# Patient Record
Sex: MALE | Race: WHITE | NOT HISPANIC OR LATINO | URBAN - METROPOLITAN AREA
[De-identification: names, ages, dates, MRNs, and addresses within clinical notes are randomized per-mention and may not be internally consistent; named-entity substitution may affect disease eponyms.]

---

## 2023-02-23 LAB
ALANINE AMINOTRANSFERASE (SGPT) (U/L) IN SER/PLAS: 49 U/L (ref 10–52)
ALBUMIN (G/DL) IN SER/PLAS: 4.5 G/DL (ref 3.4–5)
ALKALINE PHOSPHATASE (U/L) IN SER/PLAS: 76 U/L (ref 33–120)
ANION GAP IN SER/PLAS: 11 MMOL/L (ref 10–20)
ASPARTATE AMINOTRANSFERASE (SGOT) (U/L) IN SER/PLAS: 29 U/L (ref 9–39)
BASOPHILS (10*3/UL) IN BLOOD BY AUTOMATED COUNT: 0.04 X10E9/L (ref 0–0.1)
BASOPHILS/100 LEUKOCYTES IN BLOOD BY AUTOMATED COUNT: 0.6 % (ref 0–2)
BILIRUBIN TOTAL (MG/DL) IN SER/PLAS: 0.6 MG/DL (ref 0–1.2)
C REACTIVE PROTEIN (MG/L) IN SER/PLAS: 0.46 MG/DL
CALCIUM (MG/DL) IN SER/PLAS: 9.6 MG/DL (ref 8.6–10.6)
CARBON DIOXIDE, TOTAL (MMOL/L) IN SER/PLAS: 27 MMOL/L (ref 21–32)
CHLORIDE (MMOL/L) IN SER/PLAS: 104 MMOL/L (ref 98–107)
CREATININE (MG/DL) IN SER/PLAS: 0.93 MG/DL (ref 0.5–1.3)
EOSINOPHILS (10*3/UL) IN BLOOD BY AUTOMATED COUNT: 0.18 X10E9/L (ref 0–0.7)
EOSINOPHILS/100 LEUKOCYTES IN BLOOD BY AUTOMATED COUNT: 2.5 % (ref 0–6)
ERYTHROCYTE DISTRIBUTION WIDTH (RATIO) BY AUTOMATED COUNT: 12.8 % (ref 11.5–14.5)
ERYTHROCYTE MEAN CORPUSCULAR HEMOGLOBIN CONCENTRATION (G/DL) BY AUTOMATED: 32.8 G/DL (ref 32–36)
ERYTHROCYTE MEAN CORPUSCULAR VOLUME (FL) BY AUTOMATED COUNT: 90 FL (ref 80–100)
ERYTHROCYTES (10*6/UL) IN BLOOD BY AUTOMATED COUNT: 4.97 X10E12/L (ref 4.5–5.9)
GFR MALE: >90 ML/MIN/1.73M2
GLUCOSE (MG/DL) IN SER/PLAS: 113 MG/DL (ref 74–99)
HEMATOCRIT (%) IN BLOOD BY AUTOMATED COUNT: 44.8 % (ref 41–52)
HEMOGLOBIN (G/DL) IN BLOOD: 14.7 G/DL (ref 13.5–17.5)
IMMATURE GRANULOCYTES/100 LEUKOCYTES IN BLOOD BY AUTOMATED COUNT: 0.3 % (ref 0–0.9)
LEUKOCYTES (10*3/UL) IN BLOOD BY AUTOMATED COUNT: 7.1 X10E9/L (ref 4.4–11.3)
LYMPHOCYTES (10*3/UL) IN BLOOD BY AUTOMATED COUNT: 2.02 X10E9/L (ref 1.2–4.8)
LYMPHOCYTES/100 LEUKOCYTES IN BLOOD BY AUTOMATED COUNT: 28.5 % (ref 13–44)
MONOCYTES (10*3/UL) IN BLOOD BY AUTOMATED COUNT: 0.52 X10E9/L (ref 0.1–1)
MONOCYTES/100 LEUKOCYTES IN BLOOD BY AUTOMATED COUNT: 7.3 % (ref 2–10)
NEUTROPHILS (10*3/UL) IN BLOOD BY AUTOMATED COUNT: 4.32 X10E9/L (ref 1.2–7.7)
NEUTROPHILS/100 LEUKOCYTES IN BLOOD BY AUTOMATED COUNT: 60.8 % (ref 40–80)
NRBC (PER 100 WBCS) BY AUTOMATED COUNT: 0 /100 WBC (ref 0–0)
PLATELETS (10*3/UL) IN BLOOD AUTOMATED COUNT: 215 X10E9/L (ref 150–450)
POTASSIUM (MMOL/L) IN SER/PLAS: 3.8 MMOL/L (ref 3.5–5.3)
PROTEIN TOTAL: 7.6 G/DL (ref 6.4–8.2)
SEDIMENTATION RATE, ERYTHROCYTE: 29 MM/H (ref 0–15)
SODIUM (MMOL/L) IN SER/PLAS: 138 MMOL/L (ref 136–145)
UREA NITROGEN (MG/DL) IN SER/PLAS: 19 MG/DL (ref 6–23)

## 2023-04-22 LAB
ALANINE AMINOTRANSFERASE (SGPT) (U/L) IN SER/PLAS: 34 U/L (ref 10–52)
ALBUMIN (G/DL) IN SER/PLAS: 4.7 G/DL (ref 3.4–5)
ALKALINE PHOSPHATASE (U/L) IN SER/PLAS: 66 U/L (ref 33–120)
ANION GAP IN SER/PLAS: 14 MMOL/L (ref 10–20)
ASPARTATE AMINOTRANSFERASE (SGOT) (U/L) IN SER/PLAS: 34 U/L (ref 9–39)
BASOPHILS (10*3/UL) IN BLOOD BY AUTOMATED COUNT: 0.04 X10E9/L (ref 0–0.1)
BASOPHILS/100 LEUKOCYTES IN BLOOD BY AUTOMATED COUNT: 0.6 % (ref 0–2)
BILIRUBIN TOTAL (MG/DL) IN SER/PLAS: 0.6 MG/DL (ref 0–1.2)
C REACTIVE PROTEIN (MG/L) IN SER/PLAS: 0.26 MG/DL
CALCIUM (MG/DL) IN SER/PLAS: 10.2 MG/DL (ref 8.6–10.6)
CARBON DIOXIDE, TOTAL (MMOL/L) IN SER/PLAS: 25 MMOL/L (ref 21–32)
CHLORIDE (MMOL/L) IN SER/PLAS: 105 MMOL/L (ref 98–107)
CREATININE (MG/DL) IN SER/PLAS: 1.25 MG/DL (ref 0.5–1.3)
EOSINOPHILS (10*3/UL) IN BLOOD BY AUTOMATED COUNT: 0.09 X10E9/L (ref 0–0.7)
EOSINOPHILS/100 LEUKOCYTES IN BLOOD BY AUTOMATED COUNT: 1.3 % (ref 0–6)
ERYTHROCYTE DISTRIBUTION WIDTH (RATIO) BY AUTOMATED COUNT: 13.1 % (ref 11.5–14.5)
ERYTHROCYTE MEAN CORPUSCULAR HEMOGLOBIN CONCENTRATION (G/DL) BY AUTOMATED: 31.2 G/DL (ref 32–36)
ERYTHROCYTE MEAN CORPUSCULAR VOLUME (FL) BY AUTOMATED COUNT: 95 FL (ref 80–100)
ERYTHROCYTES (10*6/UL) IN BLOOD BY AUTOMATED COUNT: 5 X10E12/L (ref 4.5–5.9)
GFR MALE: 85 ML/MIN/1.73M2
GLUCOSE (MG/DL) IN SER/PLAS: 84 MG/DL (ref 74–99)
HEMATOCRIT (%) IN BLOOD BY AUTOMATED COUNT: 47.4 % (ref 41–52)
HEMOGLOBIN (G/DL) IN BLOOD: 14.8 G/DL (ref 13.5–17.5)
IMMATURE GRANULOCYTES/100 LEUKOCYTES IN BLOOD BY AUTOMATED COUNT: 0.1 % (ref 0–0.9)
LEUKOCYTES (10*3/UL) IN BLOOD BY AUTOMATED COUNT: 6.8 X10E9/L (ref 4.4–11.3)
LYMPHOCYTES (10*3/UL) IN BLOOD BY AUTOMATED COUNT: 1.66 X10E9/L (ref 1.2–4.8)
LYMPHOCYTES/100 LEUKOCYTES IN BLOOD BY AUTOMATED COUNT: 24.3 % (ref 13–44)
MONOCYTES (10*3/UL) IN BLOOD BY AUTOMATED COUNT: 0.5 X10E9/L (ref 0.1–1)
MONOCYTES/100 LEUKOCYTES IN BLOOD BY AUTOMATED COUNT: 7.3 % (ref 2–10)
NEUTROPHILS (10*3/UL) IN BLOOD BY AUTOMATED COUNT: 4.53 X10E9/L (ref 1.2–7.7)
NEUTROPHILS/100 LEUKOCYTES IN BLOOD BY AUTOMATED COUNT: 66.4 % (ref 40–80)
NRBC (PER 100 WBCS) BY AUTOMATED COUNT: 0 /100 WBC (ref 0–0)
PLATELETS (10*3/UL) IN BLOOD AUTOMATED COUNT: 236 X10E9/L (ref 150–450)
POTASSIUM (MMOL/L) IN SER/PLAS: 4.2 MMOL/L (ref 3.5–5.3)
PROTEIN TOTAL: 8.1 G/DL (ref 6.4–8.2)
SEDIMENTATION RATE, ERYTHROCYTE: 25 MM/H (ref 0–15)
SODIUM (MMOL/L) IN SER/PLAS: 140 MMOL/L (ref 136–145)
UREA NITROGEN (MG/DL) IN SER/PLAS: 21 MG/DL (ref 6–23)

## 2023-05-02 LAB
ALANINE AMINOTRANSFERASE (SGPT) (U/L) IN SER/PLAS: 29 U/L (ref 10–52)
ALBUMIN (G/DL) IN SER/PLAS: 4.6 G/DL (ref 3.4–5)
ALKALINE PHOSPHATASE (U/L) IN SER/PLAS: 60 U/L (ref 33–120)
ANION GAP IN SER/PLAS: 10 MMOL/L (ref 10–20)
ASPARTATE AMINOTRANSFERASE (SGOT) (U/L) IN SER/PLAS: 24 U/L (ref 9–39)
BASOPHILS (10*3/UL) IN BLOOD BY AUTOMATED COUNT: 0.04 X10E9/L (ref 0–0.1)
BASOPHILS/100 LEUKOCYTES IN BLOOD BY AUTOMATED COUNT: 0.6 % (ref 0–2)
BILIRUBIN TOTAL (MG/DL) IN SER/PLAS: 0.5 MG/DL (ref 0–1.2)
C REACTIVE PROTEIN (MG/L) IN SER/PLAS: 0.12 MG/DL
CALCIUM (MG/DL) IN SER/PLAS: 9.9 MG/DL (ref 8.6–10.3)
CARBON DIOXIDE, TOTAL (MMOL/L) IN SER/PLAS: 28 MMOL/L (ref 21–32)
CHLORIDE (MMOL/L) IN SER/PLAS: 105 MMOL/L (ref 98–107)
CREATININE (MG/DL) IN SER/PLAS: 1.07 MG/DL (ref 0.5–1.3)
EOSINOPHILS (10*3/UL) IN BLOOD BY AUTOMATED COUNT: 0.11 X10E9/L (ref 0–0.7)
EOSINOPHILS/100 LEUKOCYTES IN BLOOD BY AUTOMATED COUNT: 1.8 % (ref 0–6)
ERYTHROCYTE DISTRIBUTION WIDTH (RATIO) BY AUTOMATED COUNT: 12 % (ref 11.5–14.5)
ERYTHROCYTE MEAN CORPUSCULAR HEMOGLOBIN CONCENTRATION (G/DL) BY AUTOMATED: 32.8 G/DL (ref 32–36)
ERYTHROCYTE MEAN CORPUSCULAR VOLUME (FL) BY AUTOMATED COUNT: 90 FL (ref 80–100)
ERYTHROCYTES (10*6/UL) IN BLOOD BY AUTOMATED COUNT: 5.03 X10E12/L (ref 4.5–5.9)
GAMMA GLUTAMYL TRANSFERASE (U/L) IN SER/PLAS: 15 U/L (ref 5–64)
GFR MALE: >90 ML/MIN/1.73M2
GLUCOSE (MG/DL) IN SER/PLAS: 69 MG/DL (ref 74–99)
HEMATOCRIT (%) IN BLOOD BY AUTOMATED COUNT: 45.4 % (ref 41–52)
HEMOGLOBIN (G/DL) IN BLOOD: 14.9 G/DL (ref 13.5–17.5)
IMMATURE GRANULOCYTES/100 LEUKOCYTES IN BLOOD BY AUTOMATED COUNT: 0.3 % (ref 0–0.9)
LEUKOCYTES (10*3/UL) IN BLOOD BY AUTOMATED COUNT: 6.3 X10E9/L (ref 4.4–11.3)
LYMPHOCYTES (10*3/UL) IN BLOOD BY AUTOMATED COUNT: 1.64 X10E9/L (ref 1.2–4.8)
LYMPHOCYTES/100 LEUKOCYTES IN BLOOD BY AUTOMATED COUNT: 26.1 % (ref 13–44)
MONOCYTES (10*3/UL) IN BLOOD BY AUTOMATED COUNT: 0.45 X10E9/L (ref 0.1–1)
MONOCYTES/100 LEUKOCYTES IN BLOOD BY AUTOMATED COUNT: 7.2 % (ref 2–10)
NEUTROPHILS (10*3/UL) IN BLOOD BY AUTOMATED COUNT: 4.02 X10E9/L (ref 1.2–7.7)
NEUTROPHILS/100 LEUKOCYTES IN BLOOD BY AUTOMATED COUNT: 64 % (ref 40–80)
PLATELETS (10*3/UL) IN BLOOD AUTOMATED COUNT: 239 X10E9/L (ref 150–450)
POTASSIUM (MMOL/L) IN SER/PLAS: 4.4 MMOL/L (ref 3.5–5.3)
PROTEIN TOTAL: 8.1 G/DL (ref 6.4–8.2)
SEDIMENTATION RATE, ERYTHROCYTE: 11 MM/H (ref 0–15)
SODIUM (MMOL/L) IN SER/PLAS: 139 MMOL/L (ref 136–145)
UREA NITROGEN (MG/DL) IN SER/PLAS: 18 MG/DL (ref 6–23)

## 2023-05-03 LAB
ALLERGEN ANIMAL: CAT DANDER IGE (KU/L): 0.29 KU/L
ALLERGEN ANIMAL: DOG DANDER IGE (KU/L): 0.28 KU/L
ALLERGEN GRASS: BERMUDA GRASS (CYNODON DACTYLON) IGE (KU/L): 0.23 KU/L
ALLERGEN GRASS: JOHNSON GRASS (SORGHUM HALEPENSE) IGE (KU/L): 0.4 KU/L
ALLERGEN GRASS: MEADOW GRASS, KENTUCKY BLUE (POA PRATENSIS )IGE (KU/L): 4.66 KU/L
ALLERGEN GRASS: TIMOTHY GRASS (PHLEUM PRATENSE) IGE (KU/L): 3.72 KU/L
ALLERGEN INSECT: COCKROACH IGE: 1.69 KU/L
ALLERGEN MICROORGANISM: ALTERNARIA ALTERNATA IGE (KU/L): <0.1 KU/L
ALLERGEN MICROORGANISM: ASPERGILLUS FUMIGATUS IGE (KU/L): <0.1 KU/L
ALLERGEN MICROORGANISM: CLADOSPORIUM HERBARUM IGE (KU/L): <0.1 KU/L
ALLERGEN MICROORGANISM: PENICILLIUM CHRYSOGENUM (P. NOTATUM) IGE (KU/L): <0.1 KU/L
ALLERGEN MITE: DERMATOPHAGOIDES FARINAE (HOUSE DUST MITE) IGE (KU/L): 0.2 KU/L
ALLERGEN MITE: DERMATOPHAGOIDES PTERONYSSINUS (HOUSE DUST MITE) IGE (KU/L): 0.17 KU/L
ALLERGEN TREE: BOX-ELDER (ACER NEGUNDO) IGE (KU/L): 0.95 KU/L
ALLERGEN TREE: COMMON SILVER BIRCH (BETULA VERRUCOSA) IGE (KU/L): 8.11 KU/L
ALLERGEN TREE: COTTONWOOD (POPULUS DELTOIDES) IGE (KU/L): 0.21 KU/L
ALLERGEN TREE: ELM (ULMUS AMERICANA) IGE (KU/L): 0.9 KU/L
ALLERGEN TREE: MAPLE LEAF SYCAMORE, LONDON PLANE IGE (KU/L): 0.13 KU/L
ALLERGEN TREE: MOUNTAIN JUNIPER (JUNIPERUS SABINOIDES) IGE (KU/L): 0.17 KU/L
ALLERGEN TREE: MULBERRY (MORUS ALBA) IGE (KU/L): <0.1 KU/L
ALLERGEN TREE: OAK (QUERCUS ALBA) IGE (KU/L): 10 KU/L
ALLERGEN TREE: PECAN, HICKORY (CARYA PECAN) IGE (KU/L): 1.63 KU/L
ALLERGEN TREE: WALNUT IGE: 0.92 KU/L
ALLERGEN TREE: WHITE ASH (FRAXINUS AMERICANA) IGE (KU/L): 1.25 KU/L
ALLERGEN WEED: COMMON PIGWEED (AMARANTHUS RETROFLEXUS) IGE (KU/L): 0.28 KU/L
ALLERGEN WEED: COMMON RAGWEED (AMB. ARTEMISIIFOLIA/A. ELATIOR) IGE (KU/L): 0.18 KU/L
ALLERGEN WEED: GOOSEFOOT, LAMB'S QUARTERS (CHENOPODIUM ALBUM) IGE (KU/L): 0.3 KU/L
ALLERGEN WEED: PLANTAIN (ENGLISH), RIBWORT (PLANTAGO LANCEOLATA) IGE (KU/L): 0.17 KU/L
ALLERGEN WEED: PRICKLY SALTWORT/RUSSIAN THISTLE (SALSOLA KALI) IGE (KU/L): 0.72 KU/L
ALLERGEN WEED: SHEEP SORREL (RUMEX ACETOSELLA) IGE (KU/L): 0.24 KU/L
IMMUNOCAP IGE: 139 KU/L (ref 0–214)
IMMUNOCAP INTERPRETATION: ABNORMAL

## 2023-05-10 RX ORDER — FLUTICASONE PROPIONATE 50 MCG
SPRAY, SUSPENSION (ML) NASAL
Qty: 16 ML | Refills: 1 | OUTPATIENT
Start: 2023-05-10

## 2023-06-16 LAB
ALANINE AMINOTRANSFERASE (SGPT) (U/L) IN SER/PLAS: 35 U/L (ref 10–52)
ALBUMIN (G/DL) IN SER/PLAS: 4.2 G/DL (ref 3.4–5)
ALKALINE PHOSPHATASE (U/L) IN SER/PLAS: 56 U/L (ref 33–120)
ANION GAP IN SER/PLAS: 10 MMOL/L (ref 10–20)
ASPARTATE AMINOTRANSFERASE (SGOT) (U/L) IN SER/PLAS: 28 U/L (ref 9–39)
BASOPHILS (10*3/UL) IN BLOOD BY AUTOMATED COUNT: 0.04 X10E9/L (ref 0–0.1)
BASOPHILS/100 LEUKOCYTES IN BLOOD BY AUTOMATED COUNT: 0.7 % (ref 0–2)
BILIRUBIN TOTAL (MG/DL) IN SER/PLAS: 0.5 MG/DL (ref 0–1.2)
C REACTIVE PROTEIN (MG/L) IN SER/PLAS: 0.16 MG/DL
CALCIUM (MG/DL) IN SER/PLAS: 9.6 MG/DL (ref 8.6–10.6)
CARBON DIOXIDE, TOTAL (MMOL/L) IN SER/PLAS: 27 MMOL/L (ref 21–32)
CHLORIDE (MMOL/L) IN SER/PLAS: 105 MMOL/L (ref 98–107)
CREATININE (MG/DL) IN SER/PLAS: 1 MG/DL (ref 0.5–1.3)
EOSINOPHILS (10*3/UL) IN BLOOD BY AUTOMATED COUNT: 0.13 X10E9/L (ref 0–0.7)
EOSINOPHILS/100 LEUKOCYTES IN BLOOD BY AUTOMATED COUNT: 2.2 % (ref 0–6)
ERYTHROCYTE DISTRIBUTION WIDTH (RATIO) BY AUTOMATED COUNT: 12.5 % (ref 11.5–14.5)
ERYTHROCYTE MEAN CORPUSCULAR HEMOGLOBIN CONCENTRATION (G/DL) BY AUTOMATED: 32 G/DL (ref 32–36)
ERYTHROCYTE MEAN CORPUSCULAR VOLUME (FL) BY AUTOMATED COUNT: 93 FL (ref 80–100)
ERYTHROCYTES (10*6/UL) IN BLOOD BY AUTOMATED COUNT: 4.99 X10E12/L (ref 4.5–5.9)
GFR MALE: >90 ML/MIN/1.73M2
GLUCOSE (MG/DL) IN SER/PLAS: 82 MG/DL (ref 74–99)
HEMATOCRIT (%) IN BLOOD BY AUTOMATED COUNT: 46.3 % (ref 41–52)
HEMOGLOBIN (G/DL) IN BLOOD: 14.8 G/DL (ref 13.5–17.5)
IMMATURE GRANULOCYTES/100 LEUKOCYTES IN BLOOD BY AUTOMATED COUNT: 0.3 % (ref 0–0.9)
LEUKOCYTES (10*3/UL) IN BLOOD BY AUTOMATED COUNT: 6 X10E9/L (ref 4.4–11.3)
LYMPHOCYTES (10*3/UL) IN BLOOD BY AUTOMATED COUNT: 1.76 X10E9/L (ref 1.2–4.8)
LYMPHOCYTES/100 LEUKOCYTES IN BLOOD BY AUTOMATED COUNT: 29.4 % (ref 13–44)
MONOCYTES (10*3/UL) IN BLOOD BY AUTOMATED COUNT: 0.41 X10E9/L (ref 0.1–1)
MONOCYTES/100 LEUKOCYTES IN BLOOD BY AUTOMATED COUNT: 6.8 % (ref 2–10)
NEUTROPHILS (10*3/UL) IN BLOOD BY AUTOMATED COUNT: 3.63 X10E9/L (ref 1.2–7.7)
NEUTROPHILS/100 LEUKOCYTES IN BLOOD BY AUTOMATED COUNT: 60.6 % (ref 40–80)
NRBC (PER 100 WBCS) BY AUTOMATED COUNT: 0 /100 WBC (ref 0–0)
PLATELETS (10*3/UL) IN BLOOD AUTOMATED COUNT: 215 X10E9/L (ref 150–450)
POTASSIUM (MMOL/L) IN SER/PLAS: 4.3 MMOL/L (ref 3.5–5.3)
PROTEIN TOTAL: 7.2 G/DL (ref 6.4–8.2)
SEDIMENTATION RATE, ERYTHROCYTE: 12 MM/H (ref 0–15)
SODIUM (MMOL/L) IN SER/PLAS: 138 MMOL/L (ref 136–145)
UREA NITROGEN (MG/DL) IN SER/PLAS: 22 MG/DL (ref 6–23)

## 2023-08-11 LAB
ALANINE AMINOTRANSFERASE (SGPT) (U/L) IN SER/PLAS: 26 U/L (ref 10–52)
ALBUMIN (G/DL) IN SER/PLAS: 4.6 G/DL (ref 3.4–5)
ALKALINE PHOSPHATASE (U/L) IN SER/PLAS: 67 U/L (ref 33–120)
ANION GAP IN SER/PLAS: 10 MMOL/L (ref 10–20)
ASPARTATE AMINOTRANSFERASE (SGOT) (U/L) IN SER/PLAS: 28 U/L (ref 9–39)
BASOPHILS (10*3/UL) IN BLOOD BY AUTOMATED COUNT: 0.05 X10E9/L (ref 0–0.1)
BASOPHILS/100 LEUKOCYTES IN BLOOD BY AUTOMATED COUNT: 0.7 % (ref 0–2)
BILIRUBIN TOTAL (MG/DL) IN SER/PLAS: 0.4 MG/DL (ref 0–1.2)
C REACTIVE PROTEIN (MG/L) IN SER/PLAS: 0.2 MG/DL
CALCIUM (MG/DL) IN SER/PLAS: 10.2 MG/DL (ref 8.6–10.6)
CARBON DIOXIDE, TOTAL (MMOL/L) IN SER/PLAS: 26 MMOL/L (ref 21–32)
CHLORIDE (MMOL/L) IN SER/PLAS: 106 MMOL/L (ref 98–107)
CREATININE (MG/DL) IN SER/PLAS: 1.12 MG/DL (ref 0.5–1.3)
EOSINOPHILS (10*3/UL) IN BLOOD BY AUTOMATED COUNT: 0.14 X10E9/L (ref 0–0.7)
EOSINOPHILS/100 LEUKOCYTES IN BLOOD BY AUTOMATED COUNT: 1.8 % (ref 0–6)
ERYTHROCYTE DISTRIBUTION WIDTH (RATIO) BY AUTOMATED COUNT: 12.5 % (ref 11.5–14.5)
ERYTHROCYTE MEAN CORPUSCULAR HEMOGLOBIN CONCENTRATION (G/DL) BY AUTOMATED: 32.5 G/DL (ref 32–36)
ERYTHROCYTE MEAN CORPUSCULAR VOLUME (FL) BY AUTOMATED COUNT: 94 FL (ref 80–100)
ERYTHROCYTES (10*6/UL) IN BLOOD BY AUTOMATED COUNT: 4.81 X10E12/L (ref 4.5–5.9)
GFR MALE: >90 ML/MIN/1.73M2
GLUCOSE (MG/DL) IN SER/PLAS: 80 MG/DL (ref 74–99)
HEMATOCRIT (%) IN BLOOD BY AUTOMATED COUNT: 45.2 % (ref 41–52)
HEMOGLOBIN (G/DL) IN BLOOD: 14.7 G/DL (ref 13.5–17.5)
IMMATURE GRANULOCYTES/100 LEUKOCYTES IN BLOOD BY AUTOMATED COUNT: 0.3 % (ref 0–0.9)
LEUKOCYTES (10*3/UL) IN BLOOD BY AUTOMATED COUNT: 7.7 X10E9/L (ref 4.4–11.3)
LYMPHOCYTES (10*3/UL) IN BLOOD BY AUTOMATED COUNT: 1.85 X10E9/L (ref 1.2–4.8)
LYMPHOCYTES/100 LEUKOCYTES IN BLOOD BY AUTOMATED COUNT: 24.2 % (ref 13–44)
MONOCYTES (10*3/UL) IN BLOOD BY AUTOMATED COUNT: 0.53 X10E9/L (ref 0.1–1)
MONOCYTES/100 LEUKOCYTES IN BLOOD BY AUTOMATED COUNT: 6.9 % (ref 2–10)
NEUTROPHILS (10*3/UL) IN BLOOD BY AUTOMATED COUNT: 5.06 X10E9/L (ref 1.2–7.7)
NEUTROPHILS/100 LEUKOCYTES IN BLOOD BY AUTOMATED COUNT: 66.1 % (ref 40–80)
NRBC (PER 100 WBCS) BY AUTOMATED COUNT: 0 /100 WBC (ref 0–0)
PLATELETS (10*3/UL) IN BLOOD AUTOMATED COUNT: 231 X10E9/L (ref 150–450)
POTASSIUM (MMOL/L) IN SER/PLAS: 4.4 MMOL/L (ref 3.5–5.3)
PROTEIN TOTAL: 8 G/DL (ref 6.4–8.2)
SEDIMENTATION RATE, ERYTHROCYTE: 21 MM/H (ref 0–15)
SODIUM (MMOL/L) IN SER/PLAS: 138 MMOL/L (ref 136–145)
UREA NITROGEN (MG/DL) IN SER/PLAS: 25 MG/DL (ref 6–23)

## 2023-09-26 PROBLEM — H52.203 ASTIGMATISM OF BOTH EYES: Status: ACTIVE | Noted: 2023-09-26

## 2023-09-26 PROBLEM — N28.9 RENAL LESION: Status: ACTIVE | Noted: 2023-09-26

## 2023-09-26 PROBLEM — Z79.620 INFLIXIMAB (REMICADE) LONG-TERM USE: Status: ACTIVE | Noted: 2023-09-26

## 2023-09-26 PROBLEM — J30.9 ALLERGIC RHINITIS: Status: ACTIVE | Noted: 2023-09-26

## 2023-09-26 PROBLEM — D84.9 IMMUNOSUPPRESSION (MULTI): Status: ACTIVE | Noted: 2023-09-26

## 2023-09-26 PROBLEM — D64.9 ANEMIA: Status: ACTIVE | Noted: 2023-09-26

## 2023-09-26 PROBLEM — T48.5X5A RHINITIS MEDICAMENTOSA: Status: ACTIVE | Noted: 2023-09-26

## 2023-09-26 PROBLEM — K21.9 LARYNGOPHARYNGEAL REFLUX (LPR): Status: ACTIVE | Noted: 2023-09-26

## 2023-09-26 PROBLEM — J31.0 RHINITIS MEDICAMENTOSA: Status: ACTIVE | Noted: 2023-09-26

## 2023-09-26 PROBLEM — R01.1 HEART MURMUR: Status: ACTIVE | Noted: 2019-05-10

## 2023-09-26 PROBLEM — H52.00 HYPEROPIA: Status: ACTIVE | Noted: 2023-09-26

## 2023-09-26 RX ORDER — MUPIROCIN 20 MG/G
OINTMENT TOPICAL 2 TIMES DAILY
COMMUNITY
End: 2024-03-05

## 2023-09-26 RX ORDER — LIDOCAINE HYDROCHLORIDE 20 MG/ML
SOLUTION OROPHARYNGEAL AS NEEDED
COMMUNITY
Start: 2022-10-03 | End: 2024-03-05

## 2023-09-26 RX ORDER — FLUTICASONE PROPIONATE 50 MCG
2 SPRAY, SUSPENSION (ML) NASAL EVERY 12 HOURS
COMMUNITY
Start: 2023-05-02 | End: 2024-03-05

## 2023-09-26 RX ORDER — CHLORHEXIDINE GLUCONATE ORAL RINSE 1.2 MG/ML
SOLUTION DENTAL
COMMUNITY
End: 2024-03-05

## 2023-09-26 RX ORDER — ACETAMINOPHEN 500 MG
50 TABLET ORAL DAILY
COMMUNITY

## 2023-09-26 RX ORDER — HYDROCODONE BITARTRATE AND ACETAMINOPHEN 5; 325 MG/1; MG/1
1 TABLET ORAL
COMMUNITY
End: 2024-03-05

## 2023-09-26 RX ORDER — INFLIXIMAB 100 MG/10ML
INJECTION, POWDER, LYOPHILIZED, FOR SOLUTION INTRAVENOUS
COMMUNITY
Start: 2021-04-19 | End: 2024-03-05

## 2023-09-26 RX ORDER — EPINEPHRINE 0.3 MG/.3ML
0.3 INJECTION, SOLUTION INTRAMUSCULAR
COMMUNITY
Start: 2020-05-29 | End: 2024-03-05

## 2023-09-26 RX ORDER — PREDNISONE 10 MG/1
10 TABLET ORAL
COMMUNITY
End: 2024-03-05

## 2023-09-26 RX ORDER — ALBUTEROL SULFATE 90 UG/1
2 AEROSOL, METERED RESPIRATORY (INHALATION) EVERY 4 HOURS PRN
COMMUNITY
End: 2024-03-05

## 2023-09-26 RX ORDER — AZATHIOPRINE 50 MG/1
50 TABLET ORAL
COMMUNITY
End: 2024-03-05

## 2023-09-26 RX ORDER — INDOMETHACIN 75 MG/1
1 CAPSULE, EXTENDED RELEASE ORAL
COMMUNITY
End: 2024-03-05

## 2023-09-26 RX ORDER — LORATADINE 10 MG/1
1 TABLET ORAL DAILY
COMMUNITY
Start: 2023-03-03

## 2023-09-26 RX ORDER — CLINDAMYCIN HYDROCHLORIDE 300 MG/1
300 CAPSULE ORAL
COMMUNITY
Start: 2022-10-17 | End: 2024-03-05

## 2023-09-26 RX ORDER — LANSOPRAZOLE 15 MG/1
15 TABLET, ORALLY DISINTEGRATING, DELAYED RELEASE ORAL
COMMUNITY
End: 2024-03-05

## 2023-09-26 RX ORDER — DICYCLOMINE HYDROCHLORIDE 20 MG/1
20 TABLET ORAL
COMMUNITY
End: 2024-03-05

## 2023-09-26 RX ORDER — LANSOPRAZOLE 30 MG/1
30 CAPSULE, DELAYED RELEASE ORAL
COMMUNITY
Start: 2017-11-22 | End: 2024-03-05 | Stop reason: WASHOUT

## 2023-09-26 RX ORDER — INFLIXIMAB 100 MG/1
INJECTION, POWDER, LYOPHILIZED, FOR SOLUTION INTRAVENOUS
COMMUNITY
Start: 2022-12-29 | End: 2023-09-28

## 2023-09-27 DIAGNOSIS — K50.819 CROHN'S DISEASE OF SMALL AND LARGE INTESTINES WITH COMPLICATION (MULTI): ICD-10-CM

## 2023-09-27 RX ORDER — ACETAMINOPHEN 325 MG/1
650 TABLET ORAL ONCE
Status: CANCELLED | OUTPATIENT
Start: 2023-10-05

## 2023-09-27 RX ORDER — DIPHENHYDRAMINE HYDROCHLORIDE 50 MG/ML
50 INJECTION INTRAMUSCULAR; INTRAVENOUS ONCE AS NEEDED
Status: CANCELLED | OUTPATIENT
Start: 2023-10-05

## 2023-09-27 RX ORDER — EPINEPHRINE 1 MG/ML
0.5 INJECTION INTRAMUSCULAR; INTRAVENOUS; SUBCUTANEOUS ONCE AS NEEDED
Status: CANCELLED | OUTPATIENT
Start: 2023-10-05

## 2023-09-27 RX ORDER — CETIRIZINE HYDROCHLORIDE 10 MG/1
10 TABLET ORAL ONCE
Status: CANCELLED | OUTPATIENT
Start: 2023-10-05

## 2023-09-27 RX ORDER — ALBUTEROL SULFATE 0.83 MG/ML
2.5 SOLUTION RESPIRATORY (INHALATION) ONCE AS NEEDED
Status: CANCELLED | OUTPATIENT
Start: 2023-10-05

## 2023-09-27 RX ORDER — METHYLPREDNISOLONE SODIUM SUCCINATE 125 MG/2ML
100 INJECTION INTRAMUSCULAR; INTRAVENOUS ONCE AS NEEDED
Status: CANCELLED | OUTPATIENT
Start: 2023-10-05

## 2023-09-28 DIAGNOSIS — K50.819 CROHN'S DISEASE OF SMALL AND LARGE INTESTINES WITH COMPLICATION (MULTI): Primary | ICD-10-CM

## 2023-09-28 DIAGNOSIS — K50.819 CROHN'S DISEASE OF SMALL AND LARGE INTESTINES WITH COMPLICATION (MULTI): ICD-10-CM

## 2023-09-28 RX ORDER — EPINEPHRINE 0.3 MG/.3ML
0.3 INJECTION SUBCUTANEOUS EVERY 5 MIN PRN
Status: CANCELLED | OUTPATIENT
Start: 2023-10-05

## 2023-09-28 RX ORDER — FAMOTIDINE 10 MG/ML
20 INJECTION INTRAVENOUS ONCE AS NEEDED
Status: CANCELLED | OUTPATIENT
Start: 2023-10-05

## 2023-09-28 RX ORDER — DIPHENHYDRAMINE HYDROCHLORIDE 50 MG/ML
50 INJECTION INTRAMUSCULAR; INTRAVENOUS AS NEEDED
Status: CANCELLED | OUTPATIENT
Start: 2023-10-05

## 2023-09-28 RX ORDER — CETIRIZINE HYDROCHLORIDE 10 MG/1
10 TABLET ORAL ONCE
Status: CANCELLED
Start: 2023-10-05 | End: 2023-10-05

## 2023-09-28 RX ORDER — ACETAMINOPHEN 325 MG/1
650 TABLET ORAL ONCE
Status: CANCELLED | OUTPATIENT
Start: 2023-10-05 | End: 2023-10-05

## 2023-09-28 RX ORDER — ALBUTEROL SULFATE 0.83 MG/ML
3 SOLUTION RESPIRATORY (INHALATION) AS NEEDED
Status: CANCELLED | OUTPATIENT
Start: 2023-10-05

## 2023-09-28 RX ORDER — METHYLPREDNISOLONE SODIUM SUCCINATE 40 MG/ML
40 INJECTION INTRAMUSCULAR; INTRAVENOUS AS NEEDED
Status: CANCELLED | OUTPATIENT
Start: 2023-10-05

## 2023-09-28 RX ORDER — LIDOCAINE 40 MG/G
CREAM TOPICAL ONCE AS NEEDED
Status: CANCELLED
Start: 2023-10-05

## 2023-10-05 ENCOUNTER — INFUSION (OUTPATIENT)
Dept: INFUSION THERAPY | Facility: CLINIC | Age: 18
End: 2023-10-05
Payer: COMMERCIAL

## 2023-10-05 VITALS
SYSTOLIC BLOOD PRESSURE: 113 MMHG | BODY MASS INDEX: 29.45 KG/M2 | OXYGEN SATURATION: 99 % | HEART RATE: 76 BPM | RESPIRATION RATE: 16 BRPM | WEIGHT: 199.41 LBS | DIASTOLIC BLOOD PRESSURE: 68 MMHG | TEMPERATURE: 97.5 F

## 2023-10-05 DIAGNOSIS — K50.819 CROHN'S DISEASE OF SMALL AND LARGE INTESTINES WITH COMPLICATION (MULTI): ICD-10-CM

## 2023-10-05 LAB
ALBUMIN SERPL BCP-MCNC: 4.4 G/DL (ref 3.4–5)
ALP SERPL-CCNC: 58 U/L (ref 33–120)
ALT SERPL W P-5'-P-CCNC: 26 U/L (ref 10–52)
ANION GAP SERPL CALC-SCNC: 13 MMOL/L (ref 10–20)
AST SERPL W P-5'-P-CCNC: 26 U/L (ref 9–39)
BASOPHILS # BLD AUTO: 0.05 X10*3/UL (ref 0–0.1)
BASOPHILS NFR BLD AUTO: 0.8 %
BILIRUB SERPL-MCNC: 0.6 MG/DL (ref 0–1.2)
BUN SERPL-MCNC: 21 MG/DL (ref 6–23)
CALCIUM SERPL-MCNC: 9.9 MG/DL (ref 8.6–10.6)
CHLORIDE SERPL-SCNC: 105 MMOL/L (ref 98–107)
CO2 SERPL-SCNC: 25 MMOL/L (ref 21–32)
CREAT SERPL-MCNC: 1.03 MG/DL (ref 0.5–1.3)
CRP SERPL-MCNC: 0.27 MG/DL
EOSINOPHIL # BLD AUTO: 0.12 X10*3/UL (ref 0–0.7)
EOSINOPHIL NFR BLD AUTO: 1.8 %
ERYTHROCYTE [DISTWIDTH] IN BLOOD BY AUTOMATED COUNT: 12.1 % (ref 11.5–14.5)
ERYTHROCYTE [SEDIMENTATION RATE] IN BLOOD BY WESTERGREN METHOD: 14 MM/H (ref 0–15)
GFR SERPL CREATININE-BSD FRML MDRD: >90 ML/MIN/1.73M*2
GLUCOSE SERPL-MCNC: 100 MG/DL (ref 74–99)
HCT VFR BLD AUTO: 43.9 % (ref 41–52)
HGB BLD-MCNC: 13.8 G/DL (ref 13.5–17.5)
IMM GRANULOCYTES # BLD AUTO: 0.01 X10*3/UL (ref 0–0.7)
IMM GRANULOCYTES NFR BLD AUTO: 0.2 % (ref 0–0.9)
LYMPHOCYTES # BLD AUTO: 2.02 X10*3/UL (ref 1.2–4.8)
LYMPHOCYTES NFR BLD AUTO: 30.7 %
MCH RBC QN AUTO: 29.7 PG (ref 26–34)
MCHC RBC AUTO-ENTMCNC: 31.4 G/DL (ref 32–36)
MCV RBC AUTO: 95 FL (ref 80–100)
MONOCYTES # BLD AUTO: 0.54 X10*3/UL (ref 0.1–1)
MONOCYTES NFR BLD AUTO: 8.2 %
NEUTROPHILS # BLD AUTO: 3.84 X10*3/UL (ref 1.2–7.7)
NEUTROPHILS NFR BLD AUTO: 58.3 %
NRBC BLD-RTO: 0 /100 WBCS (ref 0–0)
PLATELET # BLD AUTO: 231 X10*3/UL (ref 150–450)
PMV BLD AUTO: 10.3 FL (ref 7.5–11.5)
POTASSIUM SERPL-SCNC: 4.3 MMOL/L (ref 3.5–5.3)
PROT SERPL-MCNC: 7.7 G/DL (ref 6.4–8.2)
RBC # BLD AUTO: 4.64 X10*6/UL (ref 4.5–5.9)
SODIUM SERPL-SCNC: 139 MMOL/L (ref 136–145)
WBC # BLD AUTO: 6.6 X10*3/UL (ref 4.4–11.3)

## 2023-10-05 PROCEDURE — 85652 RBC SED RATE AUTOMATED: CPT

## 2023-10-05 PROCEDURE — 96413 CHEMO IV INFUSION 1 HR: CPT | Performed by: NURSE PRACTITIONER

## 2023-10-05 PROCEDURE — 86140 C-REACTIVE PROTEIN: CPT

## 2023-10-05 PROCEDURE — 85025 COMPLETE CBC W/AUTO DIFF WBC: CPT

## 2023-10-05 PROCEDURE — 36415 COLL VENOUS BLD VENIPUNCTURE: CPT

## 2023-10-05 PROCEDURE — 2500000001 HC RX 250 WO HCPCS SELF ADMINISTERED DRUGS (ALT 637 FOR MEDICARE OP): Performed by: NURSE PRACTITIONER

## 2023-10-05 PROCEDURE — 80053 COMPREHEN METABOLIC PANEL: CPT

## 2023-10-05 RX ORDER — DIPHENHYDRAMINE HYDROCHLORIDE 50 MG/ML
50 INJECTION INTRAMUSCULAR; INTRAVENOUS AS NEEDED
Status: CANCELLED | OUTPATIENT
Start: 2023-11-30

## 2023-10-05 RX ORDER — LIDOCAINE 40 MG/G
CREAM TOPICAL ONCE AS NEEDED
Status: CANCELLED
Start: 2023-11-30

## 2023-10-05 RX ORDER — METHYLPREDNISOLONE SODIUM SUCCINATE 40 MG/ML
40 INJECTION INTRAMUSCULAR; INTRAVENOUS AS NEEDED
Status: CANCELLED | OUTPATIENT
Start: 2023-11-30

## 2023-10-05 RX ORDER — ALBUTEROL SULFATE 0.83 MG/ML
3 SOLUTION RESPIRATORY (INHALATION) AS NEEDED
Status: CANCELLED | OUTPATIENT
Start: 2023-11-30

## 2023-10-05 RX ORDER — CETIRIZINE HYDROCHLORIDE 10 MG/1
10 TABLET ORAL ONCE
Status: CANCELLED
Start: 2023-11-30 | End: 2023-11-30

## 2023-10-05 RX ORDER — CETIRIZINE HYDROCHLORIDE 10 MG/1
10 TABLET ORAL ONCE
Status: COMPLETED | OUTPATIENT
Start: 2023-10-05 | End: 2023-10-05

## 2023-10-05 RX ORDER — LIDOCAINE 40 MG/G
CREAM TOPICAL ONCE AS NEEDED
Status: DISCONTINUED | OUTPATIENT
Start: 2023-10-05 | End: 2023-10-05 | Stop reason: HOSPADM

## 2023-10-05 RX ORDER — EPINEPHRINE 0.3 MG/.3ML
0.3 INJECTION SUBCUTANEOUS EVERY 5 MIN PRN
Status: CANCELLED | OUTPATIENT
Start: 2023-11-30

## 2023-10-05 RX ORDER — ACETAMINOPHEN 325 MG/1
650 TABLET ORAL ONCE
Status: COMPLETED | OUTPATIENT
Start: 2023-10-05 | End: 2023-10-05

## 2023-10-05 RX ORDER — FAMOTIDINE 10 MG/ML
20 INJECTION INTRAVENOUS ONCE AS NEEDED
Status: CANCELLED | OUTPATIENT
Start: 2023-11-30

## 2023-10-05 RX ORDER — ACETAMINOPHEN 325 MG/1
650 TABLET ORAL ONCE
Status: CANCELLED | OUTPATIENT
Start: 2023-11-30 | End: 2023-11-30

## 2023-10-05 RX ADMIN — CETIRIZINE HYDROCHLORIDE 10 MG: 10 TABLET ORAL at 10:45

## 2023-10-05 RX ADMIN — ACETAMINOPHEN 650 MG: 325 TABLET ORAL at 10:45

## 2023-10-05 ASSESSMENT — ENCOUNTER SYMPTOMS
NECK PAIN: 0
SPEECH DIFFICULTY: 0
EXTREMITY WEAKNESS: 0
NUMBNESS: 0
DIAPHORESIS: 0
COUGH: 0
HEADACHES: 0
WHEEZING: 0
TROUBLE SWALLOWING: 0
NERVOUS/ANXIOUS: 0
SHORTNESS OF BREATH: 0
LEG SWELLING: 0
LIGHT-HEADEDNESS: 0
CHILLS: 0
APPETITE CHANGE: 0
FATIGUE: 0
BACK PAIN: 0
EYE PROBLEMS: 0
NAUSEA: 0
SORE THROAT: 0
ABDOMINAL PAIN: 0
CHEST TIGHTNESS: 0
BLOOD IN STOOL: 0
FEVER: 0
DEPRESSION: 0
CONFUSION: 0
PALPITATIONS: 0
WOUND: 0
UNEXPECTED WEIGHT CHANGE: 0
DIZZINESS: 0

## 2023-10-05 NOTE — PROGRESS NOTES
[facility]   infusion Clinic Note   Date: 2023   Name: Rob Francis  : 2005   MRN: [id number]         Reason for Visit: OP Infusion (Q 8 week Renflexis 800 mg infusion.)       Visit Type:: Infusion     Ordered By:    Accompanied by:Self      Diagnosis: No diagnosis found.      Allergies:   Allergies as of 10/05/2023 - Reviewed 10/05/2023   Allergen Reaction Noted    Iodinated contrast media Anaphylaxis 2020        [facility] which includes the following prescription(s): albuterol, azathioprine, chlorhexidine, cholecalciferol, clindamycin, dicyclomine, auvi-q, fluticasone, hydrocodone-acetaminophen, indomethacin sr, infliximab-abda, lansoprazole, lansoprazole, lidocaine, loratadine, mupirocin, prednisone, remicade, and prepopik.          Vitals:  Vitals:    10/05/23 1010   BP: 117/76   Pulse: 69   Resp: 16   Temp: 36.4 °C (97.5 °F)   SpO2: 98%   Weight: 90.5 kg (199 lb 6.5 oz)          Infusion Pre-procedure Checklist:   Allergies reviewed: yes   Medications reviewed: yes     Previous reaction to current treatment:No      Assess patient for the concerns below. Document provider notification as appropriate.  - Active or recent infection with/without current antibiotic use No  - Recent or planned invasive dental work No  - Recent or planned surgeries No  - Recently received or plans to receive vaccinations No  - Has treatment related toxicities No  - Is pregnant  N/A    - Does the patient meet criteria to treat? Yes    Provider notified: No      Pain: ' 0   Is the pain different from normal:    Is the pain tolerable:    Is your Doctor aware:        Labs:  CBD, CMP, CRP, ESR ordered       Fall Risk Screening: Susan Fall Risk  History of Falling, Immediate or Within 3 Months: No  Secondary Diagnosis: No  Ambulatory Aid: Walks without aid/bedrest/nurse assist  Intravenous Therapy/Heparin Lock: Yes  Gait/Transferring:  Normal/bedrest/immobile  Mental Status: Oriented to own ability  Davis Fall Risk Score: 20       Review of Systems   Constitutional:  Negative for appetite change, chills, diaphoresis, fatigue, fever and unexpected weight change.   HENT:   Negative for hearing loss, mouth sores, nosebleeds, sore throat, tinnitus and trouble swallowing.    Eyes:  Negative for eye problems.   Respiratory:  Negative for chest tightness, cough, shortness of breath and wheezing.    Cardiovascular:  Negative for chest pain, leg swelling and palpitations.   Gastrointestinal:  Negative for abdominal pain, blood in stool and nausea.        Pt reports having 2-3 formed BM's per day   Genitourinary: Negative.     Musculoskeletal:  Negative for back pain, gait problem and neck pain.   Skin:  Negative for itching, rash and wound.   Neurological:  Negative for dizziness, extremity weakness, gait problem, headaches, light-headedness, numbness and speech difficulty.   Psychiatric/Behavioral:  Negative for confusion and depression. The patient is not nervous/anxious.       Negative for complaint: [] all other systems have been reviewed and are negative for complaint   Infusion Readiness:   Assessment Concerns Related to Infusion: No  Provider notified: no      Document Below Only If Indicated:   Patient Education:       Drug Specific Questions:       Weight Based Drug Calculations:    Patient's dosing weight (kg): 91.4 kg    10% weight variance for prescribed treatment: 82.26 kg - 100.54 kg    Patient's weight today: 90.45 kg      Patient weight today falls outside of 10% variance or  weight range: NO      Home Care pharmacist informed of weight variance: N/A     Doses that are weight based have an acceptable variance rule within 10% of the prescribed   order and/or within  weight range. If patient weight on day of infusion falls   outside of the 10% variance, or weight range, infusion is administered and   pharmacy  contacted regarding future dosing adjustments, per policy.      Initiated By: Carmenza Lau RN   Time: 10:23 AM     Rob RAMOSRissa Francis had no medications administered during this visit.

## 2023-10-05 NOTE — PATIENT INSTRUCTIONS
Today you received: RENFLEXIS 800MG INFUSION. NEXT VISIT WILL BE IN 8 WEEKS.      For:   1. Crohn's disease of small and large intestines with complication (CMS/[facility])          Please read the  Medication Guide that was given to you and reviewed during todays visit.     (Tell all doctors including dentists that you are taking this medication)     Go to the emergency room or call 911 if:  -You have signs of allergic reaction:   o         Rash, hives, itching.   o         Swollen, blistered, peeling skin.   o         Swelling of face, lips, mouth, tongue or throat.   o         Tightness of chest, trouble breathing, swallowing or talking      Call your doctor:     - If IV / injection site gets red, warm, swollen, itchy or leaks fluid or pus.     (Leave dressing on your IV site for at least 2 hours and keep area clean and dry  - If you get sick or have symptoms of infection or are not feeling well for any reason.    (Wash your hands often, stay away from people who are sick)  - If you have side effects from your medication that do not go away or are bothersome.     (Refer to the teaching your nurse gave you for side effects to call your doctor about)     Common side effects may include:  stuffy nose, headache, feeling tired, muscle aches, upset stomach  - Before receiving any vaccines  Call [facility] if:  - You get sick, are on antibiotics, have had a recent vaccine, have surgery or dental work and your doctor wants your visit rescheduled.  - You need to cancel and reschedule your visit for any reason. Call at least 2 days before your visit if you need to cancel.   - Your insurance changes before your next visit.    (We will need to get approval from your new insurance. This can take up to two weeks.)     [facility] is opened Monday thru Friday. We are closed on weekends and holidays.     Voice mail will take your call if the center is closed. If you leave a message please allow  24 hours for a call back during weekdays. If you leave a message on a weekend/holiday, we will call you back the next business day.

## 2023-11-22 ENCOUNTER — TELEPHONE (OUTPATIENT)
Dept: PEDIATRIC GASTROENTEROLOGY | Facility: CLINIC | Age: 18
End: 2023-11-22
Payer: COMMERCIAL

## 2023-11-28 ENCOUNTER — TELEMEDICINE CLINICAL SUPPORT (OUTPATIENT)
Dept: GENETICS | Facility: CLINIC | Age: 18
End: 2023-11-28
Payer: COMMERCIAL

## 2023-11-28 DIAGNOSIS — Z84.81 FAMILY HISTORY OF GENE MUTATION: ICD-10-CM

## 2023-11-28 DIAGNOSIS — Z71.83 ENCOUNTER FOR NONPROCREATIVE GENETIC COUNSELING AND TESTING: Primary | ICD-10-CM

## 2023-11-28 DIAGNOSIS — Z80.9 FAMILY HISTORY OF CANCER: ICD-10-CM

## 2023-11-28 DIAGNOSIS — Z13.71 ENCOUNTER FOR NONPROCREATIVE GENETIC COUNSELING AND TESTING: Primary | ICD-10-CM

## 2023-11-28 PROCEDURE — 96040 PR MEDICAL GENETICS COUNSELING EACH 30 MINUTES: CPT | Performed by: GENETIC COUNSELOR, MS

## 2023-11-28 NOTE — PROGRESS NOTES
"History of Present Illness:  Rob Francis is a 17 y.o. male with a family history of a pathogenic mutation in the RAD51D gene.  The familial mutation in RAD51D was found in his maternal grandmother and his mother.  His mother was recently seen in our Cancer Genetics Clinic at  to review her genetic test results, and she advised Rob to schedule his own genetics appointment.  Rob is self-referred, and presented today for a telehealth genetic counseling visit.   He is interested in testing for the familial mutation in RAD51D.    Cancer medical history:  Personal history of cancer? No     Prior genetic testing? No     Cancer screening history:  Prostate? No  Colonoscopy? Yes, has had a couple. Most recent was 2-3 years ago. Phx Crohn's, now in remission.   Patient denies personal history of colorectal polyps.    Upper endoscopy? Yes, in the past.   Dermatology? No  Other cancer screening? No    Family history:  A 4-generation pedigree was obtained and was significant for the following:   - Maternal grandmother, diagnosed with uterine cancer at age 65, breast cancer at age 67, and a new breast cancer (lobular) at age 76 which is described as \"a different type of breast cancer\" than the previous diagnosis. She recently had genetic testing through [facility] (ordered by provider at the [facility]) in 2023, which showed a RAD51D pathogenic mutation. 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 (p.[id number]*);  - Mother, no cancer history, tested positive for the familial RAD51D pathogenic mutation;  - Maternal aunt, reportedly tested negative for the familial RAD51D mutation;  - Maternal great uncle (MGM's brother), prostate cancer at 80, living;  - Maternal grandfather, large B-cell lymphoma at 71, living;  - Maternal great uncle (MGF's brother), rectal cancer at 50,  at 50;  - Paternal great uncle (PGF's brother),  of metastatic colon cancer, diagnosed in his early 60s;  - " Paternal great grandmother (PGF's mother),  of lung cancer, +heavy smoker;  - Paternal great grandmother (PGM's mother),  of uterine cancer in her 80s, diagnosed later in life.     Maternal ancestry is Polish.  Paternal ancestry is [place]. There is no known Ashkenazi Orthodox ancestry. Consanguinity was denied.     Genetic Counseling:    Rob Francis is a 17-year-old male with a family history of various cancers, including breast, prostate, and uterine cancer. In particular, his maternal grandmother was diagnosed with three primary cancers: uterine cancer at age 65, breast cancer at age 67, and another type of breast cancer (lobular breast cancer) at age 76. His maternal grandmother underwent genetic testing through [facility] and tested positive for a RAD51D pathogenic mutation, called c.694C>T (p.[id number]*). Following this, his mother had genetic testing and tested positive for the same RAD51D mutation.     Rob has a 50% chance to have the same RAD51D mutation, and clinical genetic testing is available for him.       We discussed the RAD51D gene, which is a gene that has been linked to increased risk for female breast cancer and ovarian cancer. Recent data suggests that changes in this gene (sometimes referred to as mutations) confer an approximately 17-30% lifetime risk for breast cancer. This is elevated compared to the general population risk of 10-12%. In addition, RAD51D mutation carriers have an approximately 10-20% lifetime risk for ovarian cancer, which is elevated over the 1-2% general population risk. Some studies have suggested a possible increase in the risk for prostate cancer for men with a RAD51D mutation, but the data is still limited and the association is unclear.      Mutations in the RAD51D gene are inherited in an autosomal dominant pattern, which means that Rob and his brother EACH have a 50% chance to have the same RAD51D mutation as their mother. Likewise, Rob's maternal uncle  has a 50% chance to carry the same RAD51D mutation.  His maternal aunt reportedly tested negative.      At this time, there are no specific medical management recommendations for men with a RAD51D mutation. Thus, if Rob were to test positive for the familial mutation, it would not impact his medical care or cancer surveillance at this time.  However, guidelines may evolve over time as we learn more about the cancer risks associated with this gene. Additionally, testing for the RAD51D mutation could be helpful for understanding the chance of passing on the mutation to any potential future offspring.     Rob was counseled about hereditary cancer susceptibility including cancer risks, genetic testing, and the implications for other family members.     We discussed that a federal law, called the Genetic Information Nondiscrimination Act of 2008 (RAMIREZ), was previously put in place to protect people from genetic discrimination in health insurance and employment. However, there are some exceptions to RAMIREZ; for instance, this federal protection does not apply to life, disability, and long-term care insurance.    We discussed genetic testing in the context of familial variant analysis of RAD51D through [facility].  He is interested in this approach.      At the conclusion of our discussion today, genetic testing was ordered for Rob through [facility], which will assess for the known familial mutation in the RAD51D gene.     Results are typically available within 3-4 weeks, and Rob will be scheduled for a follow up consult in about one month to discuss his testing results.         Plan:  - Rob elected to pursue familial variant analysis of RAD51D through [facility], which will assess for the known familial mutation, c.694C>T (p.[id number]*), in the RAD51D gene. Verbal consent for testing was obtained. He plans to have his blood drawn for testing tomorrow, 11/29/23. The sample will then be sent out to [facility] for analysis.  Results are typically available in 3-4 weeks.     - Rob will be scheduled for a follow up virtual consult on 1/2/24 @ 11:30am to discuss his testing results.      - We remain available to Rob at [phone] if any questions arise regarding information discussed at today's visit.      Asuncion Gannon MGC, [facility]  Licensed Genetic Counselor  [facility]  Ph: [phone]    Reviewed by:  Anthony Tatum MD  Clinical   [facility]    Time spent with patient: 42 minutes, virtual visit

## 2023-11-28 NOTE — Clinical Note
Please schedule patient for a cancer genetics follow up visit with me on 1/2/24 at 11:30 am, virtual.  He is already aware of the apt date and time.

## 2023-11-29 ENCOUNTER — INFUSION (OUTPATIENT)
Dept: INFUSION THERAPY | Facility: CLINIC | Age: 18
End: 2023-11-29
Payer: COMMERCIAL

## 2023-11-29 VITALS
WEIGHT: 199.3 LBS | HEART RATE: 75 BPM | RESPIRATION RATE: 16 BRPM | OXYGEN SATURATION: 98 % | SYSTOLIC BLOOD PRESSURE: 113 MMHG | TEMPERATURE: 97.4 F | BODY MASS INDEX: 29.43 KG/M2 | DIASTOLIC BLOOD PRESSURE: 74 MMHG

## 2023-11-29 DIAGNOSIS — Z84.81 FAMILY HISTORY OF GENE MUTATION: ICD-10-CM

## 2023-11-29 DIAGNOSIS — Z80.9 FAMILY HISTORY OF CANCER: ICD-10-CM

## 2023-11-29 DIAGNOSIS — K50.819 CROHN'S DISEASE OF SMALL AND LARGE INTESTINES WITH COMPLICATION (MULTI): ICD-10-CM

## 2023-11-29 LAB
ALBUMIN SERPL BCP-MCNC: 4.4 G/DL (ref 3.4–5)
ALP SERPL-CCNC: 57 U/L (ref 33–120)
ALT SERPL W P-5'-P-CCNC: 30 U/L (ref 10–52)
ANION GAP SERPL CALC-SCNC: 14 MMOL/L (ref 10–20)
AST SERPL W P-5'-P-CCNC: 25 U/L (ref 9–39)
BASOPHILS # BLD AUTO: 0.03 X10*3/UL (ref 0–0.1)
BASOPHILS NFR BLD AUTO: 0.4 %
BILIRUB SERPL-MCNC: 0.3 MG/DL (ref 0–1.2)
BUN SERPL-MCNC: 19 MG/DL (ref 6–23)
CALCIUM SERPL-MCNC: 9.6 MG/DL (ref 8.6–10.6)
CHLORIDE SERPL-SCNC: 103 MMOL/L (ref 98–107)
CO2 SERPL-SCNC: 25 MMOL/L (ref 21–32)
CREAT SERPL-MCNC: 1.15 MG/DL (ref 0.5–1.3)
CRP SERPL-MCNC: 0.3 MG/DL
EOSINOPHIL # BLD AUTO: 0.03 X10*3/UL (ref 0–0.7)
EOSINOPHIL NFR BLD AUTO: 0.4 %
ERYTHROCYTE [DISTWIDTH] IN BLOOD BY AUTOMATED COUNT: 11.9 % (ref 11.5–14.5)
ERYTHROCYTE [SEDIMENTATION RATE] IN BLOOD BY WESTERGREN METHOD: 11 MM/H (ref 0–15)
GFR SERPL CREATININE-BSD FRML MDRD: >90 ML/MIN/1.73M*2
GLUCOSE SERPL-MCNC: 86 MG/DL (ref 74–99)
HCT VFR BLD AUTO: 42.6 % (ref 41–52)
HGB BLD-MCNC: 14.3 G/DL (ref 13.5–17.5)
IMM GRANULOCYTES # BLD AUTO: 0.03 X10*3/UL (ref 0–0.7)
IMM GRANULOCYTES NFR BLD AUTO: 0.4 % (ref 0–0.9)
LYMPHOCYTES # BLD AUTO: 1.2 X10*3/UL (ref 1.2–4.8)
LYMPHOCYTES NFR BLD AUTO: 14.9 %
MCH RBC QN AUTO: 29.7 PG (ref 26–34)
MCHC RBC AUTO-ENTMCNC: 33.6 G/DL (ref 32–36)
MCV RBC AUTO: 89 FL (ref 80–100)
MONOCYTES # BLD AUTO: 0.6 X10*3/UL (ref 0.1–1)
MONOCYTES NFR BLD AUTO: 7.5 %
NEUTROPHILS # BLD AUTO: 6.14 X10*3/UL (ref 1.2–7.7)
NEUTROPHILS NFR BLD AUTO: 76.4 %
NRBC BLD-RTO: 0 /100 WBCS (ref 0–0)
PLATELET # BLD AUTO: 220 X10*3/UL (ref 150–450)
POTASSIUM SERPL-SCNC: 4 MMOL/L (ref 3.5–5.3)
PROT SERPL-MCNC: 7.1 G/DL (ref 6.4–8.2)
RBC # BLD AUTO: 4.81 X10*6/UL (ref 4.5–5.9)
SODIUM SERPL-SCNC: 138 MMOL/L (ref 136–145)
WBC # BLD AUTO: 8 X10*3/UL (ref 4.4–11.3)

## 2023-11-29 PROCEDURE — 36415 COLL VENOUS BLD VENIPUNCTURE: CPT

## 2023-11-29 PROCEDURE — 85025 COMPLETE CBC W/AUTO DIFF WBC: CPT

## 2023-11-29 PROCEDURE — 86140 C-REACTIVE PROTEIN: CPT

## 2023-11-29 PROCEDURE — 85652 RBC SED RATE AUTOMATED: CPT

## 2023-11-29 PROCEDURE — 96413 CHEMO IV INFUSION 1 HR: CPT | Performed by: NURSE PRACTITIONER

## 2023-11-29 PROCEDURE — 80053 COMPREHEN METABOLIC PANEL: CPT

## 2023-11-29 RX ORDER — LIDOCAINE 40 MG/G
CREAM TOPICAL ONCE AS NEEDED
Status: CANCELLED
Start: 2023-11-30

## 2023-11-29 RX ORDER — LIDOCAINE 40 MG/G
CREAM TOPICAL ONCE AS NEEDED
Status: DISCONTINUED | OUTPATIENT
Start: 2023-11-29 | End: 2023-11-29 | Stop reason: HOSPADM

## 2023-11-29 RX ORDER — FAMOTIDINE 10 MG/ML
20 INJECTION INTRAVENOUS ONCE AS NEEDED
Status: CANCELLED | OUTPATIENT
Start: 2023-11-30

## 2023-11-29 RX ORDER — DIPHENHYDRAMINE HYDROCHLORIDE 50 MG/ML
50 INJECTION INTRAMUSCULAR; INTRAVENOUS AS NEEDED
Status: CANCELLED | OUTPATIENT
Start: 2023-11-30

## 2023-11-29 RX ORDER — ALBUTEROL SULFATE 0.83 MG/ML
3 SOLUTION RESPIRATORY (INHALATION) AS NEEDED
Status: CANCELLED | OUTPATIENT
Start: 2023-11-30

## 2023-11-29 RX ORDER — CETIRIZINE HYDROCHLORIDE 10 MG/1
10 TABLET ORAL ONCE
Status: CANCELLED
Start: 2023-11-30 | End: 2023-11-30

## 2023-11-29 RX ORDER — ACETAMINOPHEN 325 MG/1
650 TABLET ORAL ONCE
Status: COMPLETED | OUTPATIENT
Start: 2023-11-29 | End: 2023-11-29

## 2023-11-29 RX ORDER — EPINEPHRINE 0.3 MG/.3ML
0.3 INJECTION SUBCUTANEOUS EVERY 5 MIN PRN
Status: CANCELLED | OUTPATIENT
Start: 2023-11-30

## 2023-11-29 RX ORDER — ACETAMINOPHEN 325 MG/1
650 TABLET ORAL ONCE
Status: CANCELLED | OUTPATIENT
Start: 2023-11-30 | End: 2023-11-30

## 2023-11-29 RX ORDER — CETIRIZINE HYDROCHLORIDE 10 MG/1
10 TABLET ORAL ONCE
Status: COMPLETED | OUTPATIENT
Start: 2023-11-29 | End: 2023-11-29

## 2023-11-29 RX ADMIN — CETIRIZINE HYDROCHLORIDE 10 MG: 10 TABLET ORAL at 10:28

## 2023-11-29 RX ADMIN — ACETAMINOPHEN 650 MG: 325 TABLET ORAL at 10:28

## 2023-11-29 ASSESSMENT — ENCOUNTER SYMPTOMS
EXTREMITY WEAKNESS: 0
NUMBNESS: 0
SORE THROAT: 0
TROUBLE SWALLOWING: 0
DIFFICULTY URINATING: 0
ABDOMINAL PAIN: 0
CHEST TIGHTNESS: 0
HEADACHES: 0
FREQUENCY: 0
CHILLS: 0
DEPRESSION: 0
BLOOD IN STOOL: 0
COUGH: 0
CONFUSION: 0
FATIGUE: 0
WHEEZING: 0
PALPITATIONS: 0
BACK PAIN: 0
SHORTNESS OF BREATH: 0
WOUND: 0
NAUSEA: 0
FEVER: 0
UNEXPECTED WEIGHT CHANGE: 0
EYE PROBLEMS: 0
APPETITE CHANGE: 0
LEG SWELLING: 0
NECK PAIN: 0
DIZZINESS: 0
LIGHT-HEADEDNESS: 0
SPEECH DIFFICULTY: 0
VOMITING: 0
NERVOUS/ANXIOUS: 0

## 2023-11-29 NOTE — PROGRESS NOTES
[facility]   infusion Clinic Note   Date: 2023   Name: Rob Francis  : 2005   MRN: [id number]         Reason for Visit: OP Infusion (PT HERE FOR Q56 DAY RENFLEXIS 800 MG INFUSION)       Visit Type:: Infusion     Ordered By:    Accompanied by:Self      Diagnosis: Crohn's disease of small and large intestines with complication (CMS/HCC)    Family history of gene mutation    Family history of cancer      Allergies:   Allergies as of 2023 - Reviewed 2023   Allergen Reaction Noted    Iodinated contrast media Anaphylaxis 2020        Current [facility] has a current medication list which includes the following prescription(s): albuterol, azathioprine, chlorhexidine, cholecalciferol, clindamycin, dicyclomine, auvi-q, fluticasone, hydrocodone-acetaminophen, indomethacin sr, infliximab-abda, lansoprazole, lansoprazole, lidocaine, loratadine, mupirocin, prednisone, remicade, and prepopik, and the following Facility-Administered Medications: lidocaine.          Vitals:  Vitals:    23 1006 23 1100 23 1200   BP: 126/74 104/65 113/74   Pulse: 98 73 75   Resp: 16 16 16   Temp: 36.7 °C (98 °F) 36.4 °C (97.6 °F) 36.3 °C (97.4 °F)   SpO2: 96% 96% 98%   Weight: 90.4 kg (199 lb 4.7 oz)            Infusion Pre-procedure Checklist:   Allergies reviewed: yes   Medications reviewed: yes     Previous reaction to current treatment:No      Assess patient for the concerns below. Document provider notification as appropriate.  - Active or recent infection with/without current antibiotic use No  - Recent or planned invasive dental work No  - Recent or planned surgeries No  - Recently received or plans to receive vaccinations No  - Has treatment related toxicities No  - Is pregnant  N/A    - Does the patient meet criteria to treat? Yes    Provider notified: No      Pain: ' 0   Is the pain different from normal:    Is the pain tolerable:    Is your Doctor aware:         Labs:  CBD, CMP, CRP, ESR AND GEN TEST LAB DRAWN TODAY. 11/29/23.       Fall Risk Screening: Davis Fall Risk  History of Falling, Immediate or Within 3 Months: No  Secondary Diagnosis: No  Ambulatory Aid: Walks without aid/bedrest/nurse assist  Intravenous Therapy/Heparin Lock: Yes  Gait/Transferring: Normal/bedrest/immobile  Mental Status: Oriented to own ability  Davis Fall Risk Score: 20       Review of Systems   Constitutional:  Negative for appetite change, chills, fatigue, fever and unexpected weight change.   HENT:   Negative for hearing loss, mouth sores, nosebleeds, sore throat, tinnitus and trouble swallowing.    Eyes:  Negative for eye problems.   Respiratory:  Negative for chest tightness, cough, shortness of breath and wheezing.    Cardiovascular:  Negative for chest pain, leg swelling and palpitations.   Gastrointestinal:  Negative for abdominal pain, blood in stool, nausea and vomiting.        DENIES BLOOD MUCOUS OR PAIN WITH BMS. DENIES NOCTURNAL STOOLING. ADMITS TO HAVING 2-3 BMS PER DAY THAT ARE FORMED IN CONSISTENCY.    Genitourinary:  Negative for bladder incontinence, difficulty urinating and frequency.    Musculoskeletal:  Negative for back pain, gait problem and neck pain.   Skin:  Negative for itching, rash and wound.   Neurological:  Negative for dizziness, extremity weakness, gait problem, headaches, light-headedness, numbness and speech difficulty.   Psychiatric/Behavioral:  Negative for confusion and depression. The patient is not nervous/anxious.       Negative for complaint: [] all other systems have been reviewed and are negative for complaint   Infusion Readiness:   Assessment Concerns Related to Infusion: No  Provider notified: no      Document Below Only If Indicated:   Patient Education:       Drug Specific Questions:       Weight Based Drug Calculations:    Patient's dosing weight (kg): 91.4 kg    10% weight variance for prescribed treatment: 82.26 kg - 100.54 kg    Patient's  weight today: 90.40 kg    Patient weight today falls outside of 10% variance or  weight range: NO      Home Care pharmacist informed of weight variance: N/A     Doses that are weight based have an acceptable variance rule within 10% of the prescribed   order and/or within  weight range. If patient weight on day of infusion falls   outside of the 10% variance, or weight range, infusion is administered and   pharmacy contacted regarding future dosing adjustments, per policy.      Initiated By: Sonja Scott RN   Time: 2:19 PM     We administered inFLIXimab-abda (Renflexis) 800 mg in sodium chloride 0.9% 250 mL IV, cetirizine, and acetaminophen.

## 2023-11-29 NOTE — PATIENT INSTRUCTIONS
Today you received: RENFLEXIS 800 MG     NEXT APPT: 56 DAYS      For:   1. Crohn's disease of small and large intestines with complication (CMS/HCC)    2. Family history of gene mutation    3. Family history of cancer          Please read the  Medication Guide that was given to you and reviewed during todays visit.     (Tell all doctors including dentists that you are taking this medication)     Go to the emergency room or call 911 if:  -You have signs of allergic reaction:   o         Rash, hives, itching.   o         Swollen, blistered, peeling skin.   o         Swelling of face, lips, mouth, tongue or throat.   o         Tightness of chest, trouble breathing, swallowing or talking      Call your doctor:     - If IV / injection site gets red, warm, swollen, itchy or leaks fluid or pus.     (Leave dressing on your IV site for at least 2 hours and keep area clean and dry  - If you get sick or have symptoms of infection or are not feeling well for any reason.    (Wash your hands often, stay away from people who are sick)  - If you have side effects from your medication that do not go away or are bothersome.     (Refer to the teaching your nurse gave you for side effects to call your doctor about)     Common side effects may include:  stuffy nose, headache, feeling tired, muscle aches, upset stomach  - Before receiving any vaccines, Call the [facility] at   if:  - You get sick, are on antibiotics, have had a recent vaccine, have surgery or dental work and your doctor wants your visit rescheduled.  - You need to cancel and reschedule your visit for any reason. Call at least 2 days before your visit if you need to cancel.   - Your insurance changes before your next visit.    (We will need to get approval from your new insurance. This can take up to two weeks.)     [facility] is opened Monday thru Friday. We are closed on weekends and holidays.     Voice mail will take your call if  the center is closed. If you leave a message please allow 24 hours for a call back during weekdays. If you leave a message on a weekend/holiday, we will call you back the next business day.

## 2023-12-06 ENCOUNTER — TELEPHONE (OUTPATIENT)
Dept: PEDIATRIC GASTROENTEROLOGY | Facility: CLINIC | Age: 18
End: 2023-12-06
Payer: COMMERCIAL

## 2023-12-06 NOTE — TELEPHONE ENCOUNTER
Can go to web site and download form for Renflexis program -    Web site [email or web address]  Portal provides covermymeds key

## 2023-12-09 LAB — SCAN RESULT: NORMAL

## 2023-12-28 ENCOUNTER — TELEPHONE (OUTPATIENT)
Dept: GENETICS | Facility: CLINIC | Age: 18
End: 2023-12-28
Payer: COMMERCIAL

## 2023-12-28 NOTE — TELEPHONE ENCOUNTER
I spoke with the patient this morning to regarding their upcoming genetics appointment on 1/2 with Margo Gannon GC. The patient informed me they have another commitment at the time of their appointment and would like out scheduling team to reach out to them to reschedule their appointment.

## 2023-12-29 ENCOUNTER — APPOINTMENT (OUTPATIENT)
Dept: INFUSION THERAPY | Facility: CLINIC | Age: 18
End: 2023-12-29
Payer: COMMERCIAL

## 2024-01-02 ENCOUNTER — APPOINTMENT (OUTPATIENT)
Dept: GENETICS | Facility: CLINIC | Age: 19
End: 2024-01-02
Payer: COMMERCIAL

## 2024-01-05 ENCOUNTER — TELEMEDICINE CLINICAL SUPPORT (OUTPATIENT)
Dept: GENETICS | Facility: HOSPITAL | Age: 19
End: 2024-01-05
Payer: COMMERCIAL

## 2024-01-05 DIAGNOSIS — Z15.89 MONOALLELIC MUTATION OF RAD51D GENE: ICD-10-CM

## 2024-01-05 DIAGNOSIS — Z71.83 ENCOUNTER FOR NONPROCREATIVE GENETIC COUNSELING: Primary | ICD-10-CM

## 2024-01-05 PROCEDURE — 98966 PH1 ASSMT&MGMT NQHP 5-10: CPT | Performed by: GENETIC COUNSELOR, MS

## 2024-01-05 NOTE — PROGRESS NOTES
"History of Present Illness:  Rob Francis is a 18 y.o. male with a family history of a pathogenic mutation in the RAD51D gene.  The familial mutation in RAD51D was found in his mother and maternal grandmother.      Rob initially presented for genetic counseling via virtual visit on 2023. Following that appointment, he chose to pursue familial variant analysis of RAD51D through [facility], and a blood sample was sent for genetic testing.  Results of this testing have returned.     Rob returns to the clinic today via telehealth visit to review his genetic test results.       Family history (as previously noted):  A 4-generation pedigree was previously obtained and was significant for the following:   - Patient, no cancer history  - Maternal grandmother, diagnosed with uterine cancer at age 65, breast cancer at age 67, and a new breast cancer (lobular) at age 76 which is described as \"a different type of breast cancer\" than the previous diagnosis. She recently had genetic testing through [facility] (ordered by provider at the [facility]) in 2023, which showed a RAD51D pathogenic mutation. 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 (p.Sbh692*);  - Mother, no cancer history, tested positive for the familial RAD51D pathogenic mutation;  - Maternal aunt, reportedly tested negative for the familial RAD51D mutation;  - Maternal great uncle (MGM's brother), prostate cancer at 80, living;  - Maternal grandfather, large B-cell lymphoma at 71, living;  - Maternal great uncle (MGF's brother), rectal cancer at 50,  at 50;  - Paternal great uncle (PGF's brother),  of metastatic colon cancer, diagnosed in his early 60s;  - Paternal great grandmother (PGF's mother),  of lung cancer, +heavy smoker;  - Paternal great grandmother (PGM's mother),  of uterine cancer in her 80s, diagnosed later in life.     Maternal ancestry is Polish.  Paternal ancestry is Persian. There is no " known Ashkenazi Zoroastrianism ancestry. Consanguinity was denied.     Genetic test results:   Familial variant analysis of RAD51D through [facility]. Report date: 09-DEC-2023.    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     No additional alterations were detected in this gene. Results are summarized at the bottom of this note as well as attached to this encounter.    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 This mutation was previously detected in his mother, as well as his maternal grandmother.     RAD51D is a moderate risk cancer susceptibility gene that is associated with an increased risk for female breast cancer and ovarian cancer.  Recent data suggests that changes in this gene (sometimes referred to as mutations) confer an approximately 17-30% lifetime risk for female breast cancer. This is elevated compared to the general population risk of 10-12%. In addition, RAD51D mutation carriers have an approximately 10-20% lifetime risk for ovarian cancer, which is elevated over the 1-2% general population risk. Some studies have suggested a possible increase in the risk for prostate cancer for men with a RAD51D mutation, but the data is still limited and the association is unclear.      At this time, there are no specific medical management recommendations for men with a RAD51D mutation. Thus, this finding does not impact Rob's medical care or cancer surveillance at this time.  However, guidelines may evolve over time as we learn more about the cancer risks associated with this gene.     For now, Rob should continue to follow the recommendations of his physicians with regard to age-related cancer screenings. We encourage him to share his genetic test result with his PCP so that he is aware.     For women with a RAD51D gene mutation, current National Comprehensive  Cancer Network (NCCN) guidelines recommend risk-reducing removal of the ovaries at 45 to 50 years of age, as well as the consideration of increased breast cancer screening (consider annual breast MRI in addition to annual mammogram). This information would be very important to pass along to any future offspring.     Mutations in the RAD51D gene are inherited in an autosomal dominant pattern.  If Rob were to have children in the future, the chance to pass on this mutation would be 50% (1 in 2) for each child, regardless of gender. Additionally, Rob's brother has a 50% chance to have the familial RAD51D mutation.  Other maternal relatives, including Rob's maternal uncle, may also carry this mutation.  (His maternal aunt reportedly tested negative.)     We encourage Rob's maternal family members (over the age of 18) to meet with a genetic counselor or clinical  to review implications of testing. Moderately-penetrant genes like RAD51D are also thought to interact with other factors (genetic and/or non-genetic) that also contribute to cancer risk, so this mutation may not be the only factor affecting cancer risk in the family.      If any family members choose to be tested, they will need a copy of a relative's positive results. If any relatives live in the area and are interested in scheduling their own genetic counseling appointment, they may contact our [facility] by calling [phone], option 1. Relatives may also visit [email or web address] to find a genetic counselor in their area.     The information we discussed and included in this note is what is known as of this date. Our approach to Rob's family may change as more information becomes available.  We encourage him to re-contact us every 2-3 years for updated information on RAD51D, to determine if there have been any changes since our discussion today.     We encourage Rob to contact us at [phone]  with any updates, questions, or concerns.       Asuncion Gannon MGC, [facility]  Licensed Genetic Counselor  [facility]  Ph: [phone]    Reviewed by:  Anthony Tatum MD  Clinical   [facility]      Time spent with patient: 7 minutes, virtual  (11:33am - 11:40am)

## 2024-01-25 ENCOUNTER — INFUSION (OUTPATIENT)
Dept: INFUSION THERAPY | Facility: CLINIC | Age: 19
End: 2024-01-25
Payer: COMMERCIAL

## 2024-01-25 VITALS
SYSTOLIC BLOOD PRESSURE: 123 MMHG | HEART RATE: 92 BPM | BODY MASS INDEX: 30.77 KG/M2 | OXYGEN SATURATION: 99 % | RESPIRATION RATE: 18 BRPM | TEMPERATURE: 97.6 F | DIASTOLIC BLOOD PRESSURE: 75 MMHG | WEIGHT: 208.34 LBS

## 2024-01-25 DIAGNOSIS — K50.819 CROHN'S DISEASE OF SMALL AND LARGE INTESTINES WITH COMPLICATION (MULTI): ICD-10-CM

## 2024-01-25 LAB
ALBUMIN SERPL BCP-MCNC: 4.3 G/DL (ref 3.4–5)
ALP SERPL-CCNC: 60 U/L (ref 33–120)
ALT SERPL W P-5'-P-CCNC: 28 U/L (ref 10–52)
ANION GAP SERPL CALC-SCNC: 11 MMOL/L (ref 10–20)
AST SERPL W P-5'-P-CCNC: 29 U/L (ref 9–39)
BASOPHILS # BLD AUTO: 0.06 X10*3/UL (ref 0–0.1)
BASOPHILS NFR BLD AUTO: 0.5 %
BILIRUB SERPL-MCNC: 0.4 MG/DL (ref 0–1.2)
BUN SERPL-MCNC: 21 MG/DL (ref 6–23)
CALCIUM SERPL-MCNC: 9.8 MG/DL (ref 8.6–10.6)
CHLORIDE SERPL-SCNC: 105 MMOL/L (ref 98–107)
CO2 SERPL-SCNC: 25 MMOL/L (ref 21–32)
CREAT SERPL-MCNC: 1.19 MG/DL (ref 0.5–1.3)
CRP SERPL-MCNC: 0.18 MG/DL
EGFRCR SERPLBLD CKD-EPI 2021: 90 ML/MIN/1.73M*2
EOSINOPHIL # BLD AUTO: 0.11 X10*3/UL (ref 0–0.7)
EOSINOPHIL NFR BLD AUTO: 1 %
ERYTHROCYTE [DISTWIDTH] IN BLOOD BY AUTOMATED COUNT: 12.4 % (ref 11.5–14.5)
ERYTHROCYTE [SEDIMENTATION RATE] IN BLOOD BY WESTERGREN METHOD: 10 MM/H (ref 0–15)
GLUCOSE SERPL-MCNC: 86 MG/DL (ref 74–99)
HCT VFR BLD AUTO: 41.3 % (ref 41–52)
HGB BLD-MCNC: 14.1 G/DL (ref 13.5–17.5)
IMM GRANULOCYTES # BLD AUTO: 0.03 X10*3/UL (ref 0–0.7)
IMM GRANULOCYTES NFR BLD AUTO: 0.3 % (ref 0–0.9)
LYMPHOCYTES # BLD AUTO: 2.13 X10*3/UL (ref 1.2–4.8)
LYMPHOCYTES NFR BLD AUTO: 18.6 %
MCH RBC QN AUTO: 30.9 PG (ref 26–34)
MCHC RBC AUTO-ENTMCNC: 34.1 G/DL (ref 32–36)
MCV RBC AUTO: 91 FL (ref 80–100)
MONOCYTES # BLD AUTO: 0.69 X10*3/UL (ref 0.1–1)
MONOCYTES NFR BLD AUTO: 6 %
NEUTROPHILS # BLD AUTO: 8.43 X10*3/UL (ref 1.2–7.7)
NEUTROPHILS NFR BLD AUTO: 73.6 %
NRBC BLD-RTO: 0 /100 WBCS (ref 0–0)
PLATELET # BLD AUTO: 233 X10*3/UL (ref 150–450)
POTASSIUM SERPL-SCNC: 4.2 MMOL/L (ref 3.5–5.3)
PROT SERPL-MCNC: 7.6 G/DL (ref 6.4–8.2)
RBC # BLD AUTO: 4.56 X10*6/UL (ref 4.5–5.9)
SODIUM SERPL-SCNC: 137 MMOL/L (ref 136–145)
WBC # BLD AUTO: 11.5 X10*3/UL (ref 4.4–11.3)

## 2024-01-25 PROCEDURE — 85025 COMPLETE CBC W/AUTO DIFF WBC: CPT

## 2024-01-25 PROCEDURE — 85652 RBC SED RATE AUTOMATED: CPT

## 2024-01-25 PROCEDURE — 80053 COMPREHEN METABOLIC PANEL: CPT

## 2024-01-25 PROCEDURE — 96413 CHEMO IV INFUSION 1 HR: CPT | Performed by: NURSE PRACTITIONER

## 2024-01-25 PROCEDURE — 36415 COLL VENOUS BLD VENIPUNCTURE: CPT

## 2024-01-25 PROCEDURE — 86140 C-REACTIVE PROTEIN: CPT

## 2024-01-25 RX ORDER — LIDOCAINE 40 MG/G
CREAM TOPICAL ONCE AS NEEDED
Status: CANCELLED
Start: 2024-03-20

## 2024-01-25 RX ORDER — ACETAMINOPHEN 325 MG/1
650 TABLET ORAL ONCE
Status: CANCELLED | OUTPATIENT
Start: 2024-03-20 | End: 2024-03-20

## 2024-01-25 RX ORDER — LIDOCAINE 40 MG/G
CREAM TOPICAL ONCE AS NEEDED
Status: DISCONTINUED | OUTPATIENT
Start: 2024-01-25 | End: 2024-01-25 | Stop reason: HOSPADM

## 2024-01-25 RX ORDER — CETIRIZINE HYDROCHLORIDE 10 MG/1
10 TABLET ORAL ONCE
Status: COMPLETED | OUTPATIENT
Start: 2024-01-25 | End: 2024-01-25

## 2024-01-25 RX ORDER — ACETAMINOPHEN 325 MG/1
650 TABLET ORAL ONCE
Status: COMPLETED | OUTPATIENT
Start: 2024-01-25 | End: 2024-01-25

## 2024-01-25 RX ORDER — ALBUTEROL SULFATE 0.83 MG/ML
3 SOLUTION RESPIRATORY (INHALATION) AS NEEDED
Status: CANCELLED | OUTPATIENT
Start: 2024-03-20

## 2024-01-25 RX ORDER — DIPHENHYDRAMINE HYDROCHLORIDE 50 MG/ML
50 INJECTION INTRAMUSCULAR; INTRAVENOUS AS NEEDED
Status: CANCELLED | OUTPATIENT
Start: 2024-03-20

## 2024-01-25 RX ORDER — CETIRIZINE HYDROCHLORIDE 10 MG/1
10 TABLET ORAL ONCE
Status: CANCELLED
Start: 2024-03-20 | End: 2024-03-20

## 2024-01-25 RX ORDER — EPINEPHRINE 0.3 MG/.3ML
0.3 INJECTION SUBCUTANEOUS EVERY 5 MIN PRN
Status: CANCELLED | OUTPATIENT
Start: 2024-03-20

## 2024-01-25 RX ORDER — FAMOTIDINE 10 MG/ML
20 INJECTION INTRAVENOUS ONCE AS NEEDED
Status: CANCELLED | OUTPATIENT
Start: 2024-03-20

## 2024-01-25 RX ADMIN — CETIRIZINE HYDROCHLORIDE 10 MG: 10 TABLET ORAL at 10:45

## 2024-01-25 RX ADMIN — ACETAMINOPHEN 650 MG: 325 TABLET ORAL at 10:45

## 2024-01-25 ASSESSMENT — ENCOUNTER SYMPTOMS
WHEEZING: 0
CHILLS: 0
DYSURIA: 0
SHORTNESS OF BREATH: 0
HEADACHES: 0
ARTHRALGIAS: 0
ABDOMINAL PAIN: 0
FATIGUE: 0
MYALGIAS: 0
NUMBNESS: 0
HEMATURIA: 0
BLOOD IN STOOL: 0
EXTREMITY WEAKNESS: 0
APPETITE CHANGE: 0
LEG SWELLING: 0
UNEXPECTED WEIGHT CHANGE: 0
CONSTIPATION: 0
TROUBLE SWALLOWING: 0
FREQUENCY: 0
DEPRESSION: 0
SORE THROAT: 0
DIZZINESS: 0
NAUSEA: 0
COUGH: 0
VOMITING: 0
WOUND: 0
LIGHT-HEADEDNESS: 0
FEVER: 0
VOICE CHANGE: 0
NERVOUS/ANXIOUS: 0
DIARRHEA: 0
ROS GI COMMENTS: PT WITH A DX OF IBD REPORTS #  3 FORMED  BM'S PER DAY. DENIES NOTING BLOOD/MUCUS TO STOOLS.  DENIES C/O OF ABDOMINAL PAIN AND/OR BOUTS OF NOCTURNAL STOOLING.
EYE PROBLEMS: 0
PALPITATIONS: 0

## 2024-01-25 NOTE — PATIENT INSTRUCTIONS
Today : WE ADMINISTERED RENFLEXIS 800 MG IV.    For:   1. Crohn's disease of small and large intestines with complication (CMS/[facility])         Your next appointment is due in:  8 WEEKS.      Please read the  Medication Guide that was given to you and reviewed during todays visit.     (Tell all doctors including dentists that you are taking this medication)     Go to the emergency room or call 911 if:  -You have signs of allergic reaction:   -Rash, hives, itching.   -Swollen, blistered, peeling skin.   -Swelling of face, lips, mouth, tongue or throat.   -Tightness of chest, trouble breathing, swallowing or talking     Call your doctor:  - If IV / injection site gets red, warm, swollen, itchy or leaks fluid or pus.     (Leave dressing on your IV site for at least 2 hours and keep area clean and dry  - If you get sick or have symptoms of infection or are not feeling well for any reason.    (Wash your hands often, stay away from people who are sick)  - If you have side effects from your medication that do not go away or are bothersome.     (Refer to the teaching your nurse gave you for side effects to call your doctor about)    - Common side effects may include:  stuffy nose, headache, feeling tired, muscle aches, upset stomach  - Before receiving any vaccines     - Call the [facility] at   If:  - You get sick, are on antibiotics, have had a recent vaccine, have surgery or dental work and your doctor wants your visit rescheduled.  - You need to cancel and reschedule your visit for any reason. Call at least 2 days before your visit if you need to cancel.   - Your insurance changes before your next visit.    (We will need to get approval from your new insurance. This can take up to two weeks.)     The [facility] is opened Monday thru Friday. We are closed on weekends and holidays.   Voice mail will take your call if the center is closed. If you leave a message please allow 24 hours for a  call back during weekdays. If you leave a message on a weekend/holiday, we will call you back the next business day.

## 2024-01-25 NOTE — PROGRESS NOTES
[facility]   infusion Clinic Note   Date: 2024   Name: Rob Francis  : 2005   MRN: [id number]         Reason for Visit: Follow-up and OP Infusion (PATIENT NIS HERE FOR RENFLEXIS 800 MG INFUSION EVERY 8 WEEKS.)         Visit Type: INFUSION      Ordered By:  DR. MARRY ACUNA      Accompanied by:Self      Diagnosis: Crohn's disease of small and large intestines with complication (CMS/HCC)       Allergies:   Allergies as of 2024 - Reviewed 2024   Allergen Reaction Noted    Iodinated contrast media Anaphylaxis 2020         Current Medications has a current medication list which includes the following prescription(s): cholecalciferol, remicade, albuterol, azathioprine, chlorhexidine, clindamycin, dicyclomine, docosahexaenoic acid/epa, auvi-q, fluticasone, hydrocodone-acetaminophen, indomethacin sr, infliximab-abda, lansoprazole, lansoprazole, lidocaine, loratadine, mupirocin, prednisone, and prepopik, and the following Facility-Administered Medications: lidocaine.       Vitals:  Vitals:    24 1002 24 1140 24 1155 24 1255   BP: 115/76 111/73 108/69 123/75   BP Location: Right arm      Patient Position: Sitting      BP Cuff Size: Adult      Pulse: 86 75 69 92   Resp: 18 18 18 18   Temp: 36.2 °C (97.2 °F) 36.5 °C (97.7 °F) 36.5 °C (97.7 °F) 36.4 °C (97.6 °F)   TempSrc: Temporal      SpO2: 98% 97% 98% 99%   Weight: 94.5 kg (208 lb 5.4 oz)                Infusion Pre-procedure Checklist:   - Allergies reviewed: yes   - Medications reviewed: yes       - Previous reaction to current treatment: no      Assess patient for the concerns below. Document provider notification as appropriate.  - Active or recent infection with/without current antibiotic use: no  - Recent or planned invasive dental work: no  - Recent or planned surgeries: no  - Recently received or plans to receive vaccinations: no  - Has treatment related toxicities: no  - Is  pregnant:  n/a      Pain: 0   - Is the pain different from normal: n/a   - Is the pain tolerable: n/a   - Is your Doctor aware:  n/a      Labs: Labs drawn and sent per order         Fall Risk Screening: Davis Fall Risk  History of Falling, Immediate or Within 3 Months: Yes (FELL OFF OF LOFTED BED AT [place]. BRUISED HAND AND FOOT WAS CUT. NO MED ATTENTION.)  Secondary Diagnosis: No  Ambulatory Aid: Walks without aid/bedrest/nurse assist  Intravenous Therapy/Heparin Lock: Yes  Gait/Transferring: Normal/bedrest/immobile  Mental Status: Oriented to own ability  Davis Fall Risk Score: 45       Review Of Systems:  Review of Systems   Constitutional:  Negative for appetite change, chills, fatigue, fever and unexpected weight change.   HENT:   Negative for hearing loss, mouth sores, sore throat, tinnitus, trouble swallowing and voice change.    Eyes:  Negative for eye problems.   Respiratory:  Negative for cough, shortness of breath and wheezing.    Cardiovascular:  Negative for chest pain, leg swelling and palpitations.   Gastrointestinal:  Negative for abdominal pain, blood in stool, constipation, diarrhea, nausea and vomiting.        PT WITH A DX OF IBD REPORTS #  3 FORMED  BM'S PER DAY. denies NOTING BLOOD/MUCUS TO STOOLS.  denies C/O OF ABDOMINAL PAIN AND/OR BOUTS OF NOCTURNAL STOOLING.      Genitourinary:  Negative for dysuria, frequency and hematuria.    Musculoskeletal:  Negative for arthralgias and myalgias.   Skin:  Negative for itching, rash and wound.   Neurological:  Negative for dizziness, extremity weakness, headaches, light-headedness and numbness.   Psychiatric/Behavioral:  Negative for depression. The patient is not nervous/anxious.          Infusion Readiness:   - Assessment Concerns Related to Infusion: No  - Provider notified: n/a      Document Below Only If Indicated:   New Patient Education:    N/A (returning patient for continuation of therapy. Ongoing education provided as needed.)        Treatment  "Conditions & Drug Specific Questions:    InFLIXimab  (AVSOLA, INFLECTRA, REMICADE, RENFLEXIS)    (Unless otherwise specified on patient specific therapy plan):     TREATMENT CONDITIONS:  Unless otherwise specified on patient specific therapy plan HOLD and notify Provider prior to proceeding with today's infusion if patient with:  o Positive T-Spot  o Reactive Hep B sAg and/or Hep B Core Antibody    Lab Results   Component Value Date    TBSIN Negative 04/07/2022    QFG NEGATIVE 11/21/2017      Lab Results   Component Value Date    HEPBSAG NONREACTIVE 11/21/2017      No results found for: \"NONUHFIRE\", \"NONUHSWGH\", \"NONUHFISH\", \"EXTHEPBSAG\"  No results found for: \"HBCTI\", \"HEPBCAB\"  Lab Results   Component Value Date    HEPAIGM NON-REACTIVE 11/21/2017    HEPBCIGM NON-REACTIVE 11/21/2017    HEPCAB NON-REACTIVE 11/21/2017        Labs reviewed and patient meets treatment conditions? Yes    REMINDER:   WEIGHT BASED DRUG     Recommended Vitals/Observation:  Vitals:     Induction: Obtain vital signs every 30 minutes; at end of observation period and as needed.     Maintenance: Obtain vital signs at start and end of infusions  Observation:     Induction: Patient is monitored for 30 minutes post-infusion     Maintenance: No observation.        Weight Based Drug Calculations:    WEIGHT BASED DRUGS: Infliximab (REMICADE, INFLECTRA, RENFLEXIS)   Patient's dosing weight (kg):  91.4 KG    10% weight variance for prescribed treatment: 82.26 kg to 100.54 kg     Patient's weight today:   Vitals:    01/25/24 1002   Weight: 94.5 kg (208 lb 5.4 oz)        Patient weight today falls outside of 10% variance or  weight range: No     Home Care pharmacist informed of weight variance: Not applicable    Doses that are weight based have an acceptable variance rule within 10% of the prescribed   order and/or within  weight range. If patient weight on day of infusion falls   outside of the 10% variance, or weight range, " infusion is administered and   pharmacy contacted regarding future dosing adjustments, per policy.         Initiated By: Catrina Oneal RN   Time: 1:25 PM     We administered acetaminophen, cetirizine, and inFLIXimab-abda (Renflexis) 800 mg in sodium chloride 0.9% 250 mL IV.

## 2024-02-05 ENCOUNTER — HOSPITAL ENCOUNTER (OUTPATIENT)
Dept: RADIOLOGY | Facility: CLINIC | Age: 19
Discharge: HOME | End: 2024-02-05
Payer: COMMERCIAL

## 2024-02-05 DIAGNOSIS — R05.9 COUGH, UNSPECIFIED TYPE: ICD-10-CM

## 2024-02-05 PROCEDURE — 71046 X-RAY EXAM CHEST 2 VIEWS: CPT | Mod: FOREIGN READ | Performed by: RADIOLOGY

## 2024-02-05 PROCEDURE — 71046 X-RAY EXAM CHEST 2 VIEWS: CPT | Mod: FR

## 2024-03-05 ENCOUNTER — OFFICE VISIT (OUTPATIENT)
Dept: PEDIATRIC GASTROENTEROLOGY | Facility: CLINIC | Age: 19
End: 2024-03-05
Payer: COMMERCIAL

## 2024-03-05 VITALS
HEIGHT: 69 IN | OXYGEN SATURATION: 97 % | RESPIRATION RATE: 19 BRPM | SYSTOLIC BLOOD PRESSURE: 121 MMHG | BODY MASS INDEX: 30.32 KG/M2 | HEART RATE: 86 BPM | DIASTOLIC BLOOD PRESSURE: 79 MMHG | WEIGHT: 204.7 LBS | TEMPERATURE: 97.1 F

## 2024-03-05 DIAGNOSIS — D84.9 IMMUNOSUPPRESSION (MULTI): ICD-10-CM

## 2024-03-05 DIAGNOSIS — K50.819 CROHN'S DISEASE OF SMALL AND LARGE INTESTINES WITH COMPLICATION (MULTI): Primary | ICD-10-CM

## 2024-03-05 DIAGNOSIS — Z79.620 INFLIXIMAB (REMICADE) LONG-TERM USE: ICD-10-CM

## 2024-03-05 PROCEDURE — 99214 OFFICE O/P EST MOD 30 MIN: CPT | Performed by: STUDENT IN AN ORGANIZED HEALTH CARE EDUCATION/TRAINING PROGRAM

## 2024-03-05 RX ORDER — BUDESONIDE 3 MG/1
CAPSULE, COATED PELLETS ORAL
COMMUNITY
Start: 2017-06-09 | End: 2024-03-05 | Stop reason: WASHOUT

## 2024-03-05 RX ORDER — FAMOTIDINE 20 MG/1
TABLET, FILM COATED ORAL
COMMUNITY
Start: 2023-03-10 | End: 2024-03-05 | Stop reason: WASHOUT

## 2024-03-05 ASSESSMENT — PAIN SCALES - GENERAL: PAINLEVEL: 0-NO PAIN

## 2024-03-05 NOTE — PROGRESS NOTES
Pediatric Gastroenterology, Hepatology & Nutrition      Rob Francis and his caregiver were seen in the [facility] Gastroenterology, Hepatology & Nutrition Clinic in follow-up on 3/19/2024. Rob is a 18 y.o. year-old male here for follow up.    History of  Present Illness   ICN NOTEFORM  Rob is a 18 y.o. male with Crohn's disease.  His Crohn's phenotype is inflammatory, non-penetrating, non-stricturing.    Extent of disease involvement   Macroscopic lower tract involvement: ileocolonic  Macroscopic upper GI tract disease proximal to Ligament of Treitz: yes   Macroscopic upper GI tract disease distal to Ligament of Treitz: yes   Perianal disease: no    Current symptoms (on the worst day in past 7 days)  He reports on the worst day his general well-being is normal.     Limitations in daily activities were described as: no limitations.    Abdominal pain: none.    Stool number on the worst day in past 7 days: 2 .  The number of liquid/watery stools per day was   .  Most of the stools were described as formed.     Nocturnal diarrhea: no .  He reported no bloody stools .      Extraintestinal manifestations:   Fever greater than 38.5C for 3 of last 7 days: no  Definite arthritis: no  Uveitis: no  Erythema nodosum:  no  Pyoderma gangrenosum: no     Current meds/therapies:  Enteral supplement: is not on an enteral supplement.    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    Other conditions influencing health status     Procedure indicated    Other specified disorders of muscle 07/08/2022    Hamstring tightness of left lower extremity    Personal history of other diseases of the digestive system 12/17/2019    History of gastritis    Personal history of other diseases of the respiratory system 10/17/2022    History of sore throat    Personal history of other diseases of the respiratory system 10/20/2022    History of pharyngitis    Personal history of other diseases of the respiratory system 10/21/2022    History of acute pharyngitis    Personal history of other infectious and parasitic diseases 10/31/2017    History of Helicobacter pylori infection    Personal history of other specified conditions 06/05/2018    History of abdominal pain    Strain of muscle, fascia and tendon of the posterior muscle group at thigh level, left thigh, initial encounter 05/17/2022    Strain of left hamstring, initial encounter    Strain of muscle, fascia and tendon of the posterior muscle group at thigh level, left thigh, subsequent encounter 07/08/2022    Strain of muscle, fascia and tendon of the posterior muscle group at thigh level, left thigh, subsequent encounter    Unspecified injury of ear, initial encounter 02/23/2021    Left ear injury, initial encounter    Unspecified injury of ear, sequela 02/23/2021    Injury, ear, sequela    Unspecified injury of left foot, initial encounter 10/02/2021    Foot injury, left, initial encounter    Unspecified injury of left wrist, hand and finger(s), initial encounter 10/02/2021    Wrist injury, left, initial encounter    Unspecified injury of muscle, fascia and tendon of the posterior muscle group at thigh level, left thigh, initial encounter 07/08/2022    Left hamstring injury, initial encounter    Unspecified sprain of left wrist, initial encounter 10/02/2021    Wrist sprain, left, initial encounter           Surgical History   No past  "surgical history on file.        Family History   No family history on file.      Social History     Social History     Social History Narrative    Not on file         Allergies     Allergies   Allergen Reactions    Iodinated Contrast Media Anaphylaxis         Medications     Current Outpatient Medications on File Prior to Visit   Medication Sig    cholecalciferol (Vitamin D3) 50 mcg (2,000 unit) capsule Take 1 capsule (50 mcg) by mouth early in the morning..    loratadine (Claritin) 10 mg tablet Take 1 tablet (10 mg) by mouth once daily.    [DISCONTINUED] inFLIXimab-abda (Renflexis) injection Infuse 800 mg into a venous catheter every 8 (eight) weeks for 3 doses.  Dosing wt= 91.4kg     No current facility-administered medications on file prior to visit.              Physical Exam   Vital signs : /79 (BP Location: Right arm, Patient Position: Sitting)   Pulse 86   Temp 36.2 °C (97.1 °F)   Resp 19   Ht 1.753 m (5' 9\")   Wt 92.8 kg (204 lb 11.2 oz)   SpO2 97%   BMI 30.23 kg/m²      Anthropometrics:  Wt Readings from Last 3 Encounters:   03/05/24 92.8 kg (204 lb 11.2 oz)   01/25/24 94.5 kg (208 lb 5.4 oz)   11/29/23 90.4 kg (199 lb 4.7 oz) (92 %, Z= 1.38)*     * Growth percentiles are based on CDC (Boys, 2-20 Years) data.     Body mass index is 30.23 kg/m².  1.753 m (5' 9\")    Constitutional: in NAD  Head: atraumatic  Eyes: anicteric sclera, normal conjunctiva  Mouth: MMM  Neck: supple,no LAD  Respiratory: normal WOB  Abdomen: soft, not tender, non distended  Skin: no rashes  MSK: no swelling or erythema  Lymph: No LAD  Neuro: alert, moving all extremities         Results      Latest Reference Range & Units 01/25/24 10:53   GLUCOSE 74 - 99 mg/dL 86   SODIUM 136 - 145 mmol/L 137   POTASSIUM 3.5 - 5.3 mmol/L 4.2   CHLORIDE 98 - 107 mmol/L 105   Bicarbonate 21 - 32 mmol/L 25   Anion Gap 10 - 20 mmol/L 11   Blood Urea Nitrogen 6 - 23 mg/dL 21   Creatinine 0.50 - 1.30 mg/dL 1.19   EGFR >60 mL/min/1.73m*2 90 "   Calcium 8.6 - 10.6 mg/dL 9.8   Albumin 3.4 - 5.0 g/dL 4.3   Alkaline Phosphatase 33 - 120 U/L 60   ALT 10 - 52 U/L 28   AST 9 - 39 U/L 29   Bilirubin Total 0.0 - 1.2 mg/dL 0.4   Total Protein 6.4 - 8.2 g/dL 7.6   C-Reactive Protein <1.00 mg/dL 0.18   WBC 4.4 - 11.3 x10*3/uL 11.5 (H)   nRBC 0.0 - 0.0 /100 WBCs 0.0   RBC 4.50 - 5.90 x10*6/uL 4.56   HEMOGLOBIN 13.5 - 17.5 g/dL 14.1   HEMATOCRIT 41.0 - 52.0 % 41.3   MCV 80 - 100 fL 91   MCH 26.0 - 34.0 pg 30.9   MCHC 32.0 - 36.0 g/dL 34.1   RED CELL DISTRIBUTION WIDTH 11.5 - 14.5 % 12.4   Platelets 150 - 450 x10*3/uL 233   Neutrophils % 40.0 - 80.0 % 73.6   Immature Granulocytes %, Automated 0.0 - 0.9 % 0.3   Lymphocytes % 13.0 - 44.0 % 18.6   Monocytes % 2.0 - 10.0 % 6.0   Eosinophils % 0.0 - 6.0 % 1.0   Basophils % 0.0 - 2.0 % 0.5   Neutrophils Absolute 1.20 - 7.70 x10*3/uL 8.43 (H)   Immature Granulocytes Absolute, Automated 0.00 - 0.70 x10*3/uL 0.03   Lymphocytes Absolute 1.20 - 4.80 x10*3/uL 2.13   Monocytes Absolute 0.10 - 1.00 x10*3/uL 0.69   Eosinophils Absolute 0.00 - 0.70 x10*3/uL 0.11   Basophils Absolute 0.00 - 0.10 x10*3/uL 0.06   Sed Rate 0 - 15 mm/h 10      Latest Reference Range & Units 11/03/22 08:13   AB to Infliximab(ATI) Conc. <3.1 U/mL <3.1      Latest Reference Range & Units Most Recent   Infliximab(IFX) Concentration <1.0 ug/mL 5.4 !  11/3/22 08:13       STUDY:  MRI ENTEROGRAPHY; 8/30/2019 10:00 am    FINDINGS:  BOWEL:  No evidence of bowel wall thickening, abnormal enhancement or  obstruction. The appendix is within normal limits.     LIVER:  Unremarkable.     BILE DUCTS:  No biliary dilation.     GALLBLADDER:  Unremarkable.     PANCREAS:  Unremarkable.     SPLEEN:  Unremarkable.     ADRENAL GLANDS:  Unremarkable.     KIDNEYS:  Unremarkable.     BLADDER:  Unremarkable.     REPRODUCTIVE ORGANS:  Unremarkable.     LYMPH NODES:  No adenopathy is evident.     ABDOMINAL VESSELS:  Unremarkable.     PERITONEUM AND RETROPERITONEUM:  There is trace free  fluid in the pelvis. No discrete collection.     BONES AND LOWER THORAX:  No destructive osseous lesion is evident.     IMPRESSION:  1. No evidence of inflammatory bowel disease.        Latest Reference Range & Units 06/16/22 00:00   Calprotectin, Stool <=49 ug/g 43     Date of Procedure:    11/14/2019                                            FINAL DIAGNOSIS  A.  DUODENUM, SECOND PORTION, BIOPSY:  --NORMAL VILLOUS ARCHITECTURE WITH NO SIGNIFICANT HISTOPATHOLOGIC CHANGE.     B.  DUODENAL BULB, BIOPSY:    --NORMAL VILLOUS ARCHITECTURE WITH NO SIGNIFICANT HISTOPATHOLOGIC CHANGE.    C.  STOMACH, BIOPSY:    --CHRONIC INACTIVE GASTRITIS.  --NEGATIVE FOR HELICOBACTER PYLORI-LIKE ORGANISMS BY MORPHOLOGY.    D.  ESOPHAGUS, DISTAL, BIOPSY:    --NO SIGNIFICANT HISTOPATHOLOGIC CHANGE.  --NEGATIVE FOR INTRAEPITHELIAL EOSINOPHILS.    E.  ESOPHAGUS, MID, BIOPSY:  --NO SIGNIFICANT HISTOPATHOLOGIC CHANGE.  --NEGATIVE FOR INTRAEPITHELIAL EOSINOPHILS.    F.  TERMINAL ILEUM, BIOPSY:      --NORMAL VILLOUS ARCHITECTURE WITH NO SIGNIFICANT HISTOPATHOLOGIC CHANGE.    G.  COLON, RIGHT, BIOPSY:    --NO SIGNIFICANT HISTOPATHOLOGIC CHANGE.    H.  COLON, TRANSVERSE, BIOPSY:      --NO SIGNIFICANT HISTOPATHOLOGIC CHANGE.    I.  COLON, LEFT, BIOPSY:      --NO SIGNIFICANT HISTOPATHOLOGIC CHANGE.    J.  RECTUM, BIOPSY:      --NO SIGNIFICANT HISTOPATHOLOGIC CHANGE.    Comment: Disease activity is improved from previous, [id number].              Impression and Plan   ICN Assessment:  Based on current information, my global assessment of current disease status is his disease is quiescent.   Rob's growth status is satisfactory.  The overall nutritional status is satisfactory.  This patient receives a mediation that has significant potential toxicity and requires close and intensive monitoring.      Patient to be transitioned to adult    Denisse Arredondo MD  Division of Pediatric Gastroenterology, Hepatology and Nutrition

## 2024-03-05 NOTE — PATIENT INSTRUCTIONS
- will check level and vitamin D with next infusion    All results will be on line on My Chart.  Make sure sure you have signed up for My Chart.      Office phone   Office fax   Email [email or web address]     Please note:  After hours and on call [phone] and ask for Pediatric Gastroenterology Fellow on Call     I am in clinic M, T, W and may not be able to return call until Thursday/Friday.   Phone calls and email to our office are returned by one of our nurses.  Please call for prescription renewals when you have one week of medication remaining.

## 2024-03-08 ENCOUNTER — LAB (OUTPATIENT)
Dept: LAB | Facility: LAB | Age: 19
End: 2024-03-08
Payer: COMMERCIAL

## 2024-03-08 DIAGNOSIS — K50.919 CROHN'S DISEASE WITH COMPLICATION, UNSPECIFIED GASTROINTESTINAL TRACT LOCATION (MULTI): ICD-10-CM

## 2024-03-08 PROCEDURE — 83993 ASSAY FOR CALPROTECTIN FECAL: CPT

## 2024-03-12 LAB — CALPROTECTIN STL-MCNT: 17 UG/G

## 2024-03-15 ENCOUNTER — TELEPHONE (OUTPATIENT)
Dept: PEDIATRIC GASTROENTEROLOGY | Facility: HOSPITAL | Age: 19
End: 2024-03-15
Payer: COMMERCIAL

## 2024-03-15 DIAGNOSIS — K50.819 CROHN'S DISEASE OF SMALL AND LARGE INTESTINES WITH COMPLICATION (MULTI): ICD-10-CM

## 2024-03-19 ASSESSMENT — ENCOUNTER SYMPTOMS
FEVER >38.5 C ON THREE OF THE PAST SEVEN DAYS: 0
BLOOD IN STOOL: 0
NUMBER OF DAILY STOOLS PAST SEVEN DAYS: 2
STOOL DESCRIPTION: FORMED

## 2024-03-21 ENCOUNTER — INFUSION (OUTPATIENT)
Dept: INFUSION THERAPY | Facility: CLINIC | Age: 19
End: 2024-03-21
Payer: COMMERCIAL

## 2024-03-21 VITALS
OXYGEN SATURATION: 99 % | DIASTOLIC BLOOD PRESSURE: 71 MMHG | RESPIRATION RATE: 16 BRPM | WEIGHT: 204.7 LBS | SYSTOLIC BLOOD PRESSURE: 115 MMHG | TEMPERATURE: 97.1 F | BODY MASS INDEX: 30.23 KG/M2 | HEART RATE: 76 BPM

## 2024-03-21 DIAGNOSIS — K50.80 CROHN'S DISEASE OF BOTH SMALL AND LARGE INTESTINE WITHOUT COMPLICATION (MULTI): ICD-10-CM

## 2024-03-21 LAB
ALBUMIN SERPL BCP-MCNC: 4.2 G/DL (ref 3.4–5)
ALP SERPL-CCNC: 58 U/L (ref 33–120)
ALT SERPL W P-5'-P-CCNC: 34 U/L (ref 10–52)
ANION GAP SERPL CALC-SCNC: 13 MMOL/L (ref 10–20)
AST SERPL W P-5'-P-CCNC: 32 U/L (ref 9–39)
BASOPHILS # BLD AUTO: 0.04 X10*3/UL (ref 0–0.1)
BASOPHILS NFR BLD AUTO: 0.7 %
BILIRUB SERPL-MCNC: 0.7 MG/DL (ref 0–1.2)
BUN SERPL-MCNC: 18 MG/DL (ref 6–23)
CALCIUM SERPL-MCNC: 9.6 MG/DL (ref 8.6–10.6)
CHLORIDE SERPL-SCNC: 106 MMOL/L (ref 98–107)
CO2 SERPL-SCNC: 26 MMOL/L (ref 21–32)
CREAT SERPL-MCNC: 1.11 MG/DL (ref 0.5–1.3)
CRP SERPL-MCNC: 0.18 MG/DL
EGFRCR SERPLBLD CKD-EPI 2021: >90 ML/MIN/1.73M*2
EOSINOPHIL # BLD AUTO: 0.14 X10*3/UL (ref 0–0.7)
EOSINOPHIL NFR BLD AUTO: 2.3 %
ERYTHROCYTE [DISTWIDTH] IN BLOOD BY AUTOMATED COUNT: 12.9 % (ref 11.5–14.5)
ERYTHROCYTE [SEDIMENTATION RATE] IN BLOOD BY WESTERGREN METHOD: 12 MM/H (ref 0–15)
GLUCOSE SERPL-MCNC: 82 MG/DL (ref 74–99)
HCT VFR BLD AUTO: 42.7 % (ref 41–52)
HGB BLD-MCNC: 14.1 G/DL (ref 13.5–17.5)
IMM GRANULOCYTES # BLD AUTO: 0.01 X10*3/UL (ref 0–0.7)
IMM GRANULOCYTES NFR BLD AUTO: 0.2 % (ref 0–0.9)
LYMPHOCYTES # BLD AUTO: 1.61 X10*3/UL (ref 1.2–4.8)
LYMPHOCYTES NFR BLD AUTO: 26.6 %
MCH RBC QN AUTO: 30.1 PG (ref 26–34)
MCHC RBC AUTO-ENTMCNC: 33 G/DL (ref 32–36)
MCV RBC AUTO: 91 FL (ref 80–100)
MONOCYTES # BLD AUTO: 0.5 X10*3/UL (ref 0.1–1)
MONOCYTES NFR BLD AUTO: 8.3 %
NEUTROPHILS # BLD AUTO: 3.76 X10*3/UL (ref 1.2–7.7)
NEUTROPHILS NFR BLD AUTO: 61.9 %
NRBC BLD-RTO: 0 /100 WBCS (ref 0–0)
PLATELET # BLD AUTO: 215 X10*3/UL (ref 150–450)
POTASSIUM SERPL-SCNC: 4.3 MMOL/L (ref 3.5–5.3)
PROT SERPL-MCNC: 7.1 G/DL (ref 6.4–8.2)
RBC # BLD AUTO: 4.68 X10*6/UL (ref 4.5–5.9)
SODIUM SERPL-SCNC: 141 MMOL/L (ref 136–145)
WBC # BLD AUTO: 6.1 X10*3/UL (ref 4.4–11.3)

## 2024-03-21 PROCEDURE — 85025 COMPLETE CBC W/AUTO DIFF WBC: CPT

## 2024-03-21 PROCEDURE — 96413 CHEMO IV INFUSION 1 HR: CPT | Performed by: NURSE PRACTITIONER

## 2024-03-21 PROCEDURE — 80053 COMPREHEN METABOLIC PANEL: CPT

## 2024-03-21 PROCEDURE — 85652 RBC SED RATE AUTOMATED: CPT

## 2024-03-21 PROCEDURE — 86140 C-REACTIVE PROTEIN: CPT

## 2024-03-21 PROCEDURE — 36415 COLL VENOUS BLD VENIPUNCTURE: CPT

## 2024-03-21 RX ORDER — ALBUTEROL SULFATE 0.83 MG/ML
3 SOLUTION RESPIRATORY (INHALATION) AS NEEDED
Status: CANCELLED | OUTPATIENT
Start: 2024-05-16

## 2024-03-21 RX ORDER — EPINEPHRINE 0.3 MG/.3ML
0.3 INJECTION SUBCUTANEOUS EVERY 5 MIN PRN
Status: CANCELLED | OUTPATIENT
Start: 2024-05-16

## 2024-03-21 RX ORDER — FAMOTIDINE 10 MG/ML
20 INJECTION INTRAVENOUS ONCE AS NEEDED
Status: CANCELLED | OUTPATIENT
Start: 2024-05-16

## 2024-03-21 RX ORDER — ACETAMINOPHEN 325 MG/1
650 TABLET ORAL ONCE
Status: COMPLETED | OUTPATIENT
Start: 2024-03-21 | End: 2024-03-21

## 2024-03-21 RX ORDER — CETIRIZINE HYDROCHLORIDE 10 MG/1
10 TABLET ORAL ONCE
Status: CANCELLED
Start: 2024-05-16 | End: 2024-05-16

## 2024-03-21 RX ORDER — ACETAMINOPHEN 325 MG/1
650 TABLET ORAL ONCE
Status: CANCELLED | OUTPATIENT
Start: 2024-05-16 | End: 2024-05-16

## 2024-03-21 RX ORDER — DIPHENHYDRAMINE HYDROCHLORIDE 50 MG/ML
50 INJECTION INTRAMUSCULAR; INTRAVENOUS AS NEEDED
Status: CANCELLED | OUTPATIENT
Start: 2024-05-16

## 2024-03-21 RX ORDER — CETIRIZINE HYDROCHLORIDE 10 MG/1
10 TABLET ORAL ONCE
Status: COMPLETED | OUTPATIENT
Start: 2024-03-21 | End: 2024-03-21

## 2024-03-21 RX ORDER — LIDOCAINE 40 MG/G
CREAM TOPICAL ONCE AS NEEDED
Status: CANCELLED
Start: 2024-05-16

## 2024-03-21 RX ORDER — LIDOCAINE 40 MG/G
CREAM TOPICAL ONCE AS NEEDED
Status: DISCONTINUED | OUTPATIENT
Start: 2024-03-21 | End: 2024-03-21 | Stop reason: HOSPADM

## 2024-03-21 RX ADMIN — CETIRIZINE HYDROCHLORIDE 10 MG: 10 TABLET ORAL at 07:34

## 2024-03-21 RX ADMIN — ACETAMINOPHEN 650 MG: 325 TABLET ORAL at 07:34

## 2024-03-21 ASSESSMENT — ENCOUNTER SYMPTOMS
SORE THROAT: 0
HEADACHES: 0
MYALGIAS: 0
NAUSEA: 0
DIZZINESS: 0
PALPITATIONS: 0
SLEEP DISTURBANCE: 0
NUMBNESS: 0
SHORTNESS OF BREATH: 0
WHEEZING: 0
ARTHRALGIAS: 0
FREQUENCY: 0
CONFUSION: 0
CHILLS: 0
DYSURIA: 0
COUGH: 0
DIFFICULTY URINATING: 0
NERVOUS/ANXIOUS: 0
DIARRHEA: 0
TROUBLE SWALLOWING: 0
APPETITE CHANGE: 0
LEG SWELLING: 0
ABDOMINAL PAIN: 0
EXTREMITY WEAKNESS: 0
WOUND: 0
LIGHT-HEADEDNESS: 0
DEPRESSION: 0
VOMITING: 0
UNEXPECTED WEIGHT CHANGE: 0
FEVER: 0
CONSTIPATION: 0
EYE PROBLEMS: 0
FATIGUE: 0
BLOOD IN STOOL: 0

## 2024-03-21 NOTE — PATIENT INSTRUCTIONS
Today :We administered acetaminophen, cetirizine, and inFLIXimab-abda (Renflexis) 800 mg in sodium chloride 0.9% 250 mL IV.     For:   1. Crohn's disease of both small and large intestine without complication (CMS/[facility])         Your next appointment is due in:  56 days         Please read the  Medication Guide that was given to you and reviewed during todays visit.     (Tell all doctors including dentists that you are taking this medication)     Go to the emergency room or call 911 if:  -You have signs of allergic reaction:   -Rash, hives, itching.   -Swollen, blistered, peeling skin.   -Swelling of face, lips, mouth, tongue or throat.   -Tightness of chest, trouble breathing, swallowing or talking     Call your doctor:  - If IV / injection site gets red, warm, swollen, itchy or leaks fluid or pus.     (Leave dressing on your IV site for at least 2 hours and keep area clean and dry  - If you get sick or have symptoms of infection or are not feeling well for any reason.    (Wash your hands often, stay away from people who are sick)  - If you have side effects from your medication that do not go away or are bothersome.     (Refer to the teaching your nurse gave you for side effects to call your doctor about)    - Common side effects may include:  stuffy nose, headache, feeling tired, muscle aches, upset stomach  - Before receiving any vaccines     - Call the [facility] at   If:  - You get sick, are on antibiotics, have had a recent vaccine, have surgery or dental work and your doctor wants your visit rescheduled.  - You need to cancel and reschedule your visit for any reason. Call at least 2 days before your visit if you need to cancel.   - Your insurance changes before your next visit.    (We will need to get approval from your new insurance. This can take up to two weeks.)     The [facility] is opened Monday thru Friday. We are closed on weekends and holidays.   Voice mail will take  your call if the center is closed. If you leave a message please allow 24 hours for a call back during weekdays. If you leave a message on a weekend/holiday, we will call you back the next business day.

## 2024-03-21 NOTE — PROGRESS NOTES
[facility]   infusion Clinic Note   Date: 2024   Name: Rob Francis  : 2005   MRN: [id number]         Reason for Visit: OP Infusion (PT HERE FOR Q56 DAY RENFLEXIS 800 MG )         Visit Type: INFUSION      Ordered By:  DR. MARRY ACUNA      Accompanied by:Self      Diagnosis: Crohn's disease of both small and large intestine without complication (CMS/HCC)       Allergies:   Allergies as of 2024 - Reviewed 2024   Allergen Reaction Noted    Iodinated contrast media Anaphylaxis 2020         Current Medications has a current medication list which includes the following prescription(s): cholecalciferol, infliximab-abda, and loratadine, and the following Facility-Administered Medications: lidocaine.       Vitals:  Vitals:    24 0800   BP: 107/68   Pulse: 69   Resp: 16   Temp: 36.1 °C (97 °F)   SpO2: 99%             Infusion Pre-procedure Checklist:   - Allergies reviewed: yes   - Medications reviewed: yes       - Previous reaction to current treatment: no      Assess patient for the concerns below. Document provider notification as appropriate.  - Active or recent infection with/without current antibiotic use: no  - Recent or planned invasive dental work: no  - Recent or planned surgeries: no  - Recently received or plans to receive vaccinations: no  - Has treatment related toxicities: no  - Is pregnant:  n/a      Pain: 0   - Is the pain different from normal: n/a   - Is the pain tolerable: n/a   - Is your Doctor aware:  n/a      Labs: Labs drawn and sent per order         Fall Risk Screening: Davis Fall Risk  History of Falling, Immediate or Within 3 Months: No  Secondary Diagnosis: No  Ambulatory Aid: Walks without aid/bedrest/nurse assist  Intravenous Therapy/Heparin Lock: Yes  Gait/Transferring: Normal/bedrest/immobile  Mental Status: Oriented to own ability  Davis Fall Risk Score: 20       Review Of Systems:  Review of Systems   Constitutional:   Negative for appetite change, chills, fatigue, fever and unexpected weight change.   HENT:   Negative for hearing loss, mouth sores, nosebleeds, sore throat, tinnitus and trouble swallowing.    Eyes:  Negative for eye problems.   Respiratory:  Negative for cough, shortness of breath and wheezing.    Cardiovascular:  Negative for chest pain, leg swelling and palpitations.   Gastrointestinal:  Negative for abdominal pain, blood in stool, constipation, diarrhea, nausea and vomiting.        PT WITH A DX OF IBD REPORTS # 3 FORMED  BM'S PER DAY. DENIES BLOOD/MUCUS TO STOOLS.  DENIES C/O OF ABDOMINAL PAIN AND/OR BOUTS OF NOCTURNAL STOOLING.      Genitourinary:  Negative for bladder incontinence, difficulty urinating, dysuria and frequency.    Musculoskeletal:  Negative for arthralgias, gait problem and myalgias.   Skin:  Negative for itching, rash and wound.   Neurological:  Negative for dizziness, extremity weakness, gait problem, headaches, light-headedness and numbness.   Psychiatric/Behavioral:  Negative for confusion, depression, sleep disturbance and suicidal ideas. The patient is not nervous/anxious.          Infusion Readiness:   - Assessment Concerns Related to Infusion: No  - Provider notified: n/a      Document Below Only If Indicated:   New Patient Education:    N/A (returning patient for continuation of therapy. Ongoing education provided as needed.)        Treatment Conditions & Drug Specific Questions:    InFLIXimab  (AVSOLA, INFLECTRA, REMICADE, RENFLEXIS)    (Unless otherwise specified on patient specific therapy plan):     TREATMENT CONDITIONS:  Unless otherwise specified on patient specific therapy plan HOLD and notify Provider prior to proceeding with today's infusion if patient with:  o Positive T-Spot  o Reactive Hep B sAg and/or Hep B Core Antibody    Lab Results   Component Value Date    TBSIN Negative 04/07/2022    QFG NEGATIVE 11/21/2017      Lab Results   Component Value Date    HEPBSAG  "NONREACTIVE 11/21/2017      No results found for: \"NONUHFIRE\", \"NONUHSWGH\", \"NONUHFISH\", \"EXTHEPBSAG\"  No results found for: \"HBCTI\", \"HEPBCAB\"  Lab Results   Component Value Date    HEPAIGM NON-REACTIVE 11/21/2017    HEPBCIGM NON-REACTIVE 11/21/2017    HEPCAB NON-REACTIVE 11/21/2017        Labs reviewed and patient meets treatment conditions? Yes    REMINDER:   WEIGHT BASED DRUG     Recommended Vitals/Observation:  Vitals:     Induction: Obtain vital signs every 30 minutes; at end of observation period and as needed.     Maintenance: Obtain vital signs at start and end of infusions  Observation:     Induction: Patient is monitored for 30 minutes post-infusion     Maintenance: No observation.        Weight Based Drug Calculations:    WEIGHT BASED DRUGS: Infliximab (REMICADE, INFLECTRA, RENFLEXIS)   Patient's dosing weight (kg):  91.4 KG    10% weight variance for prescribed treatment: 82.26 kg to 100.54 kg     Patient's weight today: 92.85 KG  There were no vitals filed for this visit.       Patient weight today falls outside of 10% variance or  weight range: No     Home Care pharmacist informed of weight variance: Not applicable    Doses that are weight based have an acceptable variance rule within 10% of the prescribed   order and/or within  weight range. If patient weight on day of infusion falls   outside of the 10% variance, or weight range, infusion is administered and   pharmacy contacted regarding future dosing adjustments, per policy.         Initiated By: Sonja Scott RN   Time: 9:26 AM     We administered acetaminophen, cetirizine, and inFLIXimab-abda (Renflexis) 800 mg in sodium chloride 0.9% 250 mL IV.      "

## 2024-03-25 ENCOUNTER — TELEPHONE (OUTPATIENT)
Dept: PEDIATRIC GASTROENTEROLOGY | Facility: HOSPITAL | Age: 19
End: 2024-03-25
Payer: COMMERCIAL

## 2024-03-25 NOTE — TELEPHONE ENCOUNTER
Mom states the Renflexis medication is only covered until 5/31. Mom asking to discuss alternative medications. Please call.

## 2024-04-22 ENCOUNTER — TELEPHONE (OUTPATIENT)
Dept: PEDIATRIC GASTROENTEROLOGY | Facility: CLINIC | Age: 19
End: 2024-04-22
Payer: COMMERCIAL

## 2024-04-22 DIAGNOSIS — K50.80 CROHN'S DISEASE OF BOTH SMALL AND LARGE INTESTINE WITHOUT COMPLICATION (MULTI): Primary | ICD-10-CM

## 2024-04-22 DIAGNOSIS — K50.80 CROHN'S DISEASE OF BOTH SMALL AND LARGE INTESTINE WITHOUT COMPLICATION (MULTI): ICD-10-CM

## 2024-04-22 RX ORDER — DIPHENHYDRAMINE HYDROCHLORIDE 50 MG/ML
50 INJECTION INTRAMUSCULAR; INTRAVENOUS ONCE AS NEEDED
Status: CANCELLED | OUTPATIENT
Start: 2024-05-16

## 2024-04-22 RX ORDER — EPINEPHRINE 1 MG/ML
0.01 INJECTION INTRAMUSCULAR; INTRAVENOUS; SUBCUTANEOUS ONCE AS NEEDED
Status: CANCELLED | OUTPATIENT
Start: 2024-05-16

## 2024-04-22 RX ORDER — CETIRIZINE HYDROCHLORIDE 10 MG/1
10 TABLET ORAL ONCE
Status: CANCELLED | OUTPATIENT
Start: 2024-05-16

## 2024-04-22 RX ORDER — INFLIXIMAB 100 MG/10ML
800 INJECTION, POWDER, LYOPHILIZED, FOR SOLUTION INTRAVENOUS
Qty: 800 MG | Refills: 4 | OUTPATIENT
Start: 2024-04-22

## 2024-04-22 RX ORDER — ALBUTEROL SULFATE 0.83 MG/ML
2.5 SOLUTION RESPIRATORY (INHALATION) ONCE AS NEEDED
Status: CANCELLED | OUTPATIENT
Start: 2024-05-16

## 2024-04-22 RX ORDER — ACETAMINOPHEN 325 MG/1
650 TABLET ORAL ONCE
Status: CANCELLED | OUTPATIENT
Start: 2024-05-16

## 2024-04-22 NOTE — TELEPHONE ENCOUNTER
Penny Pearson Joseph 2005 MRN [id number]   Renflexis       Renflexis is now excluded from coverage under the  Employee Health Plan [employer]. The plan prefers *Avsola or unbranded Infliximab* instead.    In order to adhere to the  Employee Health Plan formulary, and to reduce the time for our mutual patient to start therapy, please:  Modify the patient's therapy plan to indicate *Avsola or unbranded Infliximab *. We will submit a prior authorization request to the insurance and provide financial assistance investigation, if needed.  Ensure that a prescription is sent to  [facility] for *Avsola or unbranded Infliximab *. (see attached instructions if needed)    If you believe the patient is not a candidate for *Avsola or unbranded Infliximab* we will submit an Exception to Coverage Request to the  Employee Health Plan [employer]. An Exception to Coverage Request requires documentation that “all formulary alternatives must be tried and failed or contraindicated.” We are not able to submit an Exception to Coverage request without documentation on why the patient is not a candidate for *Avsola or unbranded Infliximab *.     Patient is due for next infusion on 05/16/2024.    All patients with the  Employee plan who are currently receiving Renflexis must be switched to Avsola or unbranded infliximab by 05/31/2024.    Arlei Canela  Pharmacy [facility]  Specialty Pharmacy  [address] [place], [place] [address]  F: [phone]  P: [phone]  [email or web address]

## 2024-04-23 RX ORDER — INFLIXIMAB 100 MG/10ML
800 INJECTION, POWDER, LYOPHILIZED, FOR SOLUTION INTRAVENOUS
Qty: 800 MG | Refills: 0 | OUTPATIENT
Start: 2024-04-23 | End: 2024-06-22

## 2024-05-09 DIAGNOSIS — K50.80 CROHN'S DISEASE OF BOTH SMALL AND LARGE INTESTINE WITHOUT COMPLICATION (MULTI): Primary | ICD-10-CM

## 2024-05-15 PROBLEM — G80.9 CEREBRAL PALSY (MULTI): Status: ACTIVE | Noted: 2024-05-15

## 2024-05-15 PROBLEM — S76.319A RUPTURE OF HAMSTRING TENDON: Status: ACTIVE | Noted: 2024-05-15

## 2024-05-15 PROBLEM — S76.319A RUPTURE OF HAMSTRING TENDON: Status: RESOLVED | Noted: 2024-05-15 | Resolved: 2024-05-15

## 2024-05-15 PROBLEM — T48.5X5A RHINITIS MEDICAMENTOSA: Status: RESOLVED | Noted: 2023-09-26 | Resolved: 2024-05-15

## 2024-05-15 PROBLEM — S62.645A NONDISPLACED FRACTURE OF PROXIMAL PHALANX OF LEFT RING FINGER, INITIAL ENCOUNTER FOR CLOSED FRACTURE: Status: ACTIVE | Noted: 2024-05-15

## 2024-05-15 PROBLEM — Z86.19 HISTORY OF HELICOBACTER PYLORI INFECTION: Status: ACTIVE | Noted: 2024-05-15

## 2024-05-15 PROBLEM — E79.89: Status: ACTIVE | Noted: 2017-06-09

## 2024-05-15 PROBLEM — K59.00 CONSTIPATION: Status: ACTIVE | Noted: 2017-06-09

## 2024-05-15 PROBLEM — S62.645A NONDISPLACED FRACTURE OF PROXIMAL PHALANX OF LEFT RING FINGER, INITIAL ENCOUNTER FOR CLOSED FRACTURE: Status: RESOLVED | Noted: 2024-05-15 | Resolved: 2024-05-15

## 2024-05-15 PROBLEM — T50.8X5A ALLERGIC REACTION TO CONTRAST DYE: Status: ACTIVE | Noted: 2024-05-15

## 2024-05-15 PROBLEM — J31.0 RHINITIS MEDICAMENTOSA: Status: RESOLVED | Noted: 2023-09-26 | Resolved: 2024-05-15

## 2024-05-15 PROBLEM — D64.9 ANEMIA: Status: RESOLVED | Noted: 2023-09-26 | Resolved: 2024-05-15

## 2024-05-15 NOTE — PROGRESS NOTES
"Subjective   History was provided by the  pt .  Rob Francis is a 18 y.o. male who is here for this well visit.  - unable to see last [facility] note  - hrg    Current Issues:  Current concerns include:  derm for male pttn hair loss - ercom derm  - PM radha, no snoring, no eye allergy gtt so discussed trying them x 2mos  Heart murmur  - no longer    Monoallelic mutation of RAD51D gene  - no f/u needed    Constipation  - no longer    Crohn's disease of both small and large intestine without complication (Multi)  - gets inflix infusions q2mos  - RBC GI    Color blindness  - sees optometry   - no glasses    Allergic rhinitis  - spring:  Clar/Flon    Sleep: all night  Dental:  brushes teeth 2x/d - sees dentist  Testicular self-exams discussed    Review of Nutrition:  Current diet: adequate milk/Vit D source - MVI/D/fish oil  Adequate fruit/vegetable intake     Social Screening:   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  School performance: doing well; no concerns  Activities:  gets regular exercise  lift /run     Job:  Yes     Safety:  Risk factors for sexually-transmitted infections: sexually active prefers F only - no hx STD/denies needing tested today - condoms qtime  Using alcohol/drug use:  yes - EtOH once/wk  Tobacco/nicotine use:  no use  Uses seat belt - no texting while driving    Screening Questions:  Mood (see PHQ9): good    Objective   /72 (BP Location: Left arm, Patient Position: Sitting)   Pulse 108   Ht 1.74 m (5' 8.5\")   Wt 91.3 kg (201 lb 6 oz)   BMI 30.17 kg/m²   Physical Exam  Constitutional:       Appearance: Normal appearance.   HENT:      Right Ear: Tympanic membrane normal.      Left Ear: Tympanic membrane normal.      Nose: Nose normal.      Mouth/Throat:      Mouth: Mucous membranes are moist.      Pharynx: Oropharynx is clear.   Eyes:      Extraocular Movements: Extraocular movements intact.   Cardiovascular:      Rate and Rhythm: Normal rate and regular rhythm.      Pulses:           Radial " pulses are 2+ on the right side and 2+ on the left side.      Heart sounds: No murmur heard.  Pulmonary:      Effort: Pulmonary effort is normal.      Breath sounds: Normal breath sounds.   Abdominal:      General: Abdomen is flat.      Palpations: Abdomen is soft. There is no hepatomegaly, splenomegaly or mass.   Genitourinary:     Penis: Normal.       Testes: Normal.         Right: Testicular hydrocele not present.         Left: Testicular hydrocele not present.      Tay stage (genital): 5.   Musculoskeletal:         General: Normal range of motion.      Cervical back: Normal range of motion and neck supple.      Thoracic back: No scoliosis.      Lumbar back: No scoliosis.   Lymphadenopathy:      Cervical: No cervical adenopathy.   Skin:     General: Skin is warm and dry.   Neurological:      General: No focal deficit present.      Mental Status: He is alert.      Deep Tendon Reflexes:      Reflex Scores:       Patellar reflexes are 2+ on the right side and 2+ on the left side.  Psychiatric:         Mood and Affect: Mood normal.         Behavior: Behavior normal.       Assessment/Plan   18 y.o. male w/ NL G+D  1. Anticipatory guidance discussed.   2. Cleared for school/sports  3. Vaccine, if given, discussed and consented.  4. Follow up in 1 year for next well child exam or sooner with concerns.

## 2024-05-16 ENCOUNTER — INFUSION (OUTPATIENT)
Dept: INFUSION THERAPY | Facility: CLINIC | Age: 19
End: 2024-05-16
Payer: COMMERCIAL

## 2024-05-16 VITALS
DIASTOLIC BLOOD PRESSURE: 72 MMHG | RESPIRATION RATE: 18 BRPM | HEIGHT: 69 IN | WEIGHT: 210.21 LBS | HEART RATE: 57 BPM | SYSTOLIC BLOOD PRESSURE: 110 MMHG | BODY MASS INDEX: 31.13 KG/M2 | OXYGEN SATURATION: 98 % | TEMPERATURE: 98 F

## 2024-05-16 DIAGNOSIS — K50.80 CROHN'S DISEASE OF BOTH SMALL AND LARGE INTESTINE WITHOUT COMPLICATION (MULTI): ICD-10-CM

## 2024-05-16 LAB
ALBUMIN SERPL BCP-MCNC: 4.1 G/DL (ref 3.4–5)
ALP SERPL-CCNC: 60 U/L (ref 33–120)
ALT SERPL W P-5'-P-CCNC: 36 U/L (ref 10–52)
ANION GAP SERPL CALC-SCNC: 13 MMOL/L (ref 10–20)
AST SERPL W P-5'-P-CCNC: 44 U/L (ref 9–39)
BASOPHILS # BLD AUTO: 0.06 X10*3/UL (ref 0–0.1)
BASOPHILS NFR BLD AUTO: 1 %
BILIRUB SERPL-MCNC: 0.7 MG/DL (ref 0–1.2)
BUN SERPL-MCNC: 22 MG/DL (ref 6–23)
CALCIUM SERPL-MCNC: 9.4 MG/DL (ref 8.6–10.6)
CHLORIDE SERPL-SCNC: 104 MMOL/L (ref 98–107)
CO2 SERPL-SCNC: 25 MMOL/L (ref 21–32)
CREAT SERPL-MCNC: 1.16 MG/DL (ref 0.5–1.3)
CRP SERPL-MCNC: 0.26 MG/DL
EGFRCR SERPLBLD CKD-EPI 2021: >90 ML/MIN/1.73M*2
EOSINOPHIL # BLD AUTO: 0.15 X10*3/UL (ref 0–0.7)
EOSINOPHIL NFR BLD AUTO: 2.4 %
ERYTHROCYTE [DISTWIDTH] IN BLOOD BY AUTOMATED COUNT: 12.4 % (ref 11.5–14.5)
ERYTHROCYTE [SEDIMENTATION RATE] IN BLOOD BY WESTERGREN METHOD: 17 MM/H (ref 0–15)
GLUCOSE SERPL-MCNC: 84 MG/DL (ref 74–99)
HCT VFR BLD AUTO: 43.3 % (ref 41–52)
HGB BLD-MCNC: 14.2 G/DL (ref 13.5–17.5)
IMM GRANULOCYTES # BLD AUTO: 0.01 X10*3/UL (ref 0–0.7)
IMM GRANULOCYTES NFR BLD AUTO: 0.2 % (ref 0–0.9)
LYMPHOCYTES # BLD AUTO: 1.61 X10*3/UL (ref 1.2–4.8)
LYMPHOCYTES NFR BLD AUTO: 25.6 %
MCH RBC QN AUTO: 29.5 PG (ref 26–34)
MCHC RBC AUTO-ENTMCNC: 32.8 G/DL (ref 32–36)
MCV RBC AUTO: 90 FL (ref 80–100)
MONOCYTES # BLD AUTO: 0.57 X10*3/UL (ref 0.1–1)
MONOCYTES NFR BLD AUTO: 9.1 %
NEUTROPHILS # BLD AUTO: 3.89 X10*3/UL (ref 1.2–7.7)
NEUTROPHILS NFR BLD AUTO: 61.7 %
NRBC BLD-RTO: 0 /100 WBCS (ref 0–0)
PLATELET # BLD AUTO: 237 X10*3/UL (ref 150–450)
POTASSIUM SERPL-SCNC: 4.3 MMOL/L (ref 3.5–5.3)
PROT SERPL-MCNC: 7.4 G/DL (ref 6.4–8.2)
RBC # BLD AUTO: 4.81 X10*6/UL (ref 4.5–5.9)
SODIUM SERPL-SCNC: 138 MMOL/L (ref 136–145)
WBC # BLD AUTO: 6.3 X10*3/UL (ref 4.4–11.3)

## 2024-05-16 PROCEDURE — 96415 CHEMO IV INFUSION ADDL HR: CPT | Performed by: NURSE PRACTITIONER

## 2024-05-16 PROCEDURE — 36415 COLL VENOUS BLD VENIPUNCTURE: CPT

## 2024-05-16 PROCEDURE — 85652 RBC SED RATE AUTOMATED: CPT

## 2024-05-16 PROCEDURE — 80053 COMPREHEN METABOLIC PANEL: CPT

## 2024-05-16 PROCEDURE — 96413 CHEMO IV INFUSION 1 HR: CPT | Performed by: NURSE PRACTITIONER

## 2024-05-16 PROCEDURE — 85025 COMPLETE CBC W/AUTO DIFF WBC: CPT

## 2024-05-16 PROCEDURE — 86140 C-REACTIVE PROTEIN: CPT

## 2024-05-16 PROCEDURE — 86481 TB AG RESPONSE T-CELL SUSP: CPT

## 2024-05-16 RX ORDER — CETIRIZINE HYDROCHLORIDE 10 MG/1
10 TABLET ORAL ONCE
Status: DISCONTINUED | OUTPATIENT
Start: 2024-05-16 | End: 2024-05-16 | Stop reason: HOSPADM

## 2024-05-16 RX ORDER — ACETAMINOPHEN 325 MG/1
650 TABLET ORAL ONCE
Status: COMPLETED | OUTPATIENT
Start: 2024-05-16 | End: 2024-05-16

## 2024-05-16 RX ORDER — ALBUTEROL SULFATE 0.83 MG/ML
2.5 SOLUTION RESPIRATORY (INHALATION) ONCE AS NEEDED
OUTPATIENT
Start: 2024-07-11

## 2024-05-16 RX ORDER — DIPHENHYDRAMINE HYDROCHLORIDE 50 MG/ML
50 INJECTION INTRAMUSCULAR; INTRAVENOUS ONCE AS NEEDED
OUTPATIENT
Start: 2024-07-11

## 2024-05-16 RX ORDER — CETIRIZINE HYDROCHLORIDE 10 MG/1
10 TABLET ORAL ONCE
OUTPATIENT
Start: 2024-07-11

## 2024-05-16 RX ORDER — EPINEPHRINE 1 MG/ML
0.01 INJECTION INTRAMUSCULAR; INTRAVENOUS; SUBCUTANEOUS ONCE AS NEEDED
OUTPATIENT
Start: 2024-07-11

## 2024-05-16 RX ORDER — ACETAMINOPHEN 325 MG/1
650 TABLET ORAL ONCE
OUTPATIENT
Start: 2024-07-11

## 2024-05-16 RX ADMIN — ACETAMINOPHEN 650 MG: 325 TABLET ORAL at 09:25

## 2024-05-16 ASSESSMENT — ENCOUNTER SYMPTOMS
DEPRESSION: 0
FREQUENCY: 0
TROUBLE SWALLOWING: 0
UNEXPECTED WEIGHT CHANGE: 0
ARTHRALGIAS: 0
HEADACHES: 0
VOMITING: 0
NERVOUS/ANXIOUS: 0
APPETITE CHANGE: 0
EXTREMITY WEAKNESS: 0
PALPITATIONS: 0
MYALGIAS: 0
HEMATURIA: 0
LEG SWELLING: 0
WOUND: 0
CONSTIPATION: 0
DIZZINESS: 0
SHORTNESS OF BREATH: 0
EYE PROBLEMS: 0
SORE THROAT: 0
LIGHT-HEADEDNESS: 0
COUGH: 0
NUMBNESS: 0
FATIGUE: 0
ROS GI COMMENTS: PT WITH A DX OF IBD REPORTS #  2 SOFT  BM'S PER DAY. DENIES NOTING BLOOD/MUCUS TO STOOLS.  DENIES C/O OF ABDOMINAL PAIN AND/OR BOUTS OF NOCTURNAL STOOLING.
DIARRHEA: 0
WHEEZING: 0
CHILLS: 0
ABDOMINAL PAIN: 0
BLOOD IN STOOL: 0
FEVER: 0
NAUSEA: 0
DYSURIA: 0
VOICE CHANGE: 0

## 2024-05-16 NOTE — PROGRESS NOTES
"[facility]   Infusion Clinic Note   Date: May 16, 2024   Name: Rob Francis  : 2005   MRN: [id number]         Reason for Visit: Follow-up and OP Infusion (PATIENT IS HERE FOR INITIAL DOSE OF UNBRANDED INFLIXIMAB 800 MG EVERY 8 WEEKS. )         Visit Type: INFUSION      Ordered By:  DR. ACUNA      Accompanied by:Self      Diagnosis: Crohn's disease of both small and large intestine without complication (Multi)       Allergies:   Allergies as of 2024 - Reviewed 2024   Allergen Reaction Noted    Iodinated contrast media Anaphylaxis 2020         Current Medications has a current medication list which includes the following prescription(s): cholecalciferol, loratadine, infliximab, infliximab, and infliximab-abda, and the following Facility-Administered Medications: cetirizine.       Vitals:   Vitals:    24 0903 24 1025 24 1035 24 1055   BP: 120/78 109/69 105/68 106/67   Pulse: 73 66 64 66   Resp: 18 18 18 18   Temp: 36.7 °C (98.1 °F) 36.7 °C (98.1 °F) 36.8 °C (98.2 °F) 36.8 °C (98.3 °F)   TempSrc:   Temporal Temporal   SpO2: 98% 97% 97% 97%   Weight:   92.5 kg (204 lb) 92.4 kg (203 lb 9.5 oz)   Height:   1.753 m (5' 9\") 1.753 m (5' 9\")    24 1115 24 1137 24 1218   BP: 115/68 104/63 110/72   Pulse: 63 62 57   Resp: 18 18 18   Temp: 36.7 °C (98 °F) 36.9 °C (98.4 °F) 36.7 °C (98 °F)   TempSrc: Temporal Temporal Temporal   SpO2: 97% 97% 98%   Weight: 92.4 kg (203 lb 9.5 oz) 95.4 kg (210 lb 3.3 oz)    Height: 1.753 m (5' 9\") 1.753 m (5' 9\")              Infusion Pre-procedure Checklist:   - Allergies reviewed: yes   - Medications reviewed: yes       - Previous reaction to current treatment: no      Assess patient for the concerns below. Document provider notification as appropriate.  - Active or recent infection with/without current antibiotic use: no  - Recent or planned invasive dental work: no  - Recent or planned surgeries: no  - " Recently received or plans to receive vaccinations: no  - Has treatment related toxicities: n/a  - Is pregnant:  n/a      Pain: 0   - Is the pain different from normal: n/a   - Is the pain tolerable: n/a   - Is your Doctor aware:  n/a      Labs: Labs drawn and sent per order         Fall Risk Screening: Davis Fall Risk  History of Falling, Immediate or Within 3 Months: No  Secondary Diagnosis: No  Ambulatory Aid: Walks without aid/bedrest/nurse assist  Intravenous Therapy/Heparin Lock: Yes  Gait/Transferring: Normal/bedrest/immobile  Mental Status: Oriented to own ability  Davis Fall Risk Score: 20       Review Of Systems:  Review of Systems   Constitutional:  Negative for appetite change, chills, fatigue, fever and unexpected weight change.   HENT:   Negative for hearing loss, mouth sores, sore throat, tinnitus, trouble swallowing and voice change.    Eyes:  Negative for eye problems.   Respiratory:  Negative for cough, shortness of breath and wheezing.    Cardiovascular:  Negative for chest pain, leg swelling and palpitations.   Gastrointestinal:  Negative for abdominal pain, blood in stool, constipation, diarrhea, nausea and vomiting.        PT WITH A DX OF IBD REPORTS #  2 SOFT  BM'S PER DAY. denies NOTING BLOOD/MUCUS TO STOOLS.  denies C/O OF ABDOMINAL PAIN AND/OR BOUTS OF NOCTURNAL STOOLING.      Genitourinary:  Negative for dysuria, frequency and hematuria.    Musculoskeletal:  Negative for arthralgias and myalgias.   Skin:  Negative for itching, rash and wound.   Neurological:  Negative for dizziness, extremity weakness, headaches, light-headedness and numbness.   Psychiatric/Behavioral:  Negative for depression. The patient is not nervous/anxious.          Infusion Readiness:   - Assessment Concerns Related to Infusion: No  - Provider notified: no      Document Below Only If Indicated:   New Patient Education:    N/A (returning patient for continuation of therapy. Ongoing education provided as needed.)       "  Treatment Conditions & Drug Specific Questions:    InFLIXimab  (AVSOLA, INFLECTRA, REMICADE, RENFLEXIS)    (Unless otherwise specified on patient specific therapy plan):     TREATMENT CONDITIONS:  Unless otherwise specified on patient specific therapy plan HOLD and notify Provider prior to proceeding with today's infusion if patient with:  o Positive T-Spot  o Reactive Hep B sAg and/or Hep B Core Antibody    Lab Results   Component Value Date    TBSIN Negative 04/07/2022    QFG NEGATIVE 11/21/2017      Lab Results   Component Value Date    HEPBSAG NONREACTIVE 11/21/2017      No results found for: \"NONUHFIRE\", \"NONUHSWGH\", \"NONUHFISH\", \"EXTHEPBSAG\"  No results found for: \"HBCTI\", \"HEPBCAB\"  Lab Results   Component Value Date    HEPAIGM NON-REACTIVE 11/21/2017    HEPBCIGM NON-REACTIVE 11/21/2017    HEPCAB NON-REACTIVE 11/21/2017        Labs reviewed and patient meets treatment conditions? Yes    DRUG SPECIFIC QUESTIONS:    Any new or worsening signs / symptoms of heart failure which may include things like worsening shortness of breath, swelling, fatigue? No   (If yes notify provider before proceeding with today's infusion. New onset or worsening HF have been reported with infliximab)    REMINDER:   WEIGHT BASED DRUG     Recommended Vitals/Observation:  Vitals:     Induction: Obtain vital signs every 30 minutes; at end of observation period and as needed.     Maintenance: Obtain vital signs at start and end of infusions  Observation:     Induction: Patient is monitored for 30 minutes post-infusion     Maintenance: No observation.        Weight Based Drug Calculations:    WEIGHT BASED DRUGS: Infliximab (REMICADE, INFLECTRA, RENFLEXIS)   Patient's dosing weight (kg): 91.4 KG    10% weight variance for prescribed treatment: 82.26 kg to 100.54 kg     Patient's weight today: 92.35 KG  Vitals:    05/16/24 1137   Weight: 95.4 kg (210 lb 3.3 oz)         Patient weight today falls outside of 10% variance or  " weight range: No     Home Care pharmacist informed of weight variance: Not applicable    Doses that are weight based have an acceptable variance rule within 10% of the prescribed   order and/or within  weight range. If patient weight on day of infusion falls   outside of the 10% variance, or weight range, infusion is administered and   pharmacy contacted regarding future dosing adjustments, per policy.         Initiated By: Catrina Oneal RN   Time: 1:04 PM     We administered acetaminophen and (Unbranded) inFLIXimab 800 mg in sodium chloride 0.9% 250 mL IV.

## 2024-05-16 NOTE — PATIENT INSTRUCTIONS
Today :We administered acetaminophen and (Unbranded) inFLIXimab 800 mg in sodium chloride 0.9% 250 mL IV.     For:   1. Crohn's disease of both small and large intestine without complication (Multi)         Your next appointment is due in:  8 WEEKS.     Please read the  Medication Guide that was given to you and reviewed during todays visit.     (Tell all doctors including dentists that you are taking this medication)     Go to the emergency room or call 911 if:  -You have signs of allergic reaction:   -Rash, hives, itching.   -Swollen, blistered, peeling skin.   -Swelling of face, lips, mouth, tongue or throat.   -Tightness of chest, trouble breathing, swallowing or talking     Call your doctor:  - If IV / injection site gets red, warm, swollen, itchy or leaks fluid or pus.     (Leave dressing on your IV site for at least 2 hours and keep area clean and dry  - If you get sick or have symptoms of infection or are not feeling well for any reason.    (Wash your hands often, stay away from people who are sick)  - If you have side effects from your medication that do not go away or are bothersome.     (Refer to the teaching your nurse gave you for side effects to call your doctor about)    - Common side effects may include:  stuffy nose, headache, feeling tired, muscle aches, upset stomach  - Before receiving any vaccines     - Call the [facility] at   If:  - You get sick, are on antibiotics, have had a recent vaccine, have surgery or dental work and your doctor wants your visit rescheduled.  - You need to cancel and reschedule your visit for any reason. Call at least 2 days before your visit if you need to cancel.   - Your insurance changes before your next visit.    (We will need to get approval from your new insurance. This can take up to two weeks.)     The [facility] is opened Monday thru Friday. We are closed on weekends and holidays.   Voice mail will take your call if the center  is closed. If you leave a message please allow 24 hours for a call back during weekdays. If you leave a message on a weekend/holiday, we will call you back the next business day.

## 2024-05-18 LAB
NIL(NEG) CONTROL SPOT COUNT: NORMAL
PANEL A SPOT COUNT: 0
PANEL B SPOT COUNT: 1
POS CONTROL SPOT COUNT: NORMAL
T-SPOT. TB INTERPRETATION: NEGATIVE

## 2024-05-21 ENCOUNTER — OFFICE VISIT (OUTPATIENT)
Dept: PEDIATRICS | Facility: CLINIC | Age: 19
End: 2024-05-21
Payer: COMMERCIAL

## 2024-05-21 VITALS
WEIGHT: 201.38 LBS | HEIGHT: 69 IN | SYSTOLIC BLOOD PRESSURE: 130 MMHG | HEART RATE: 108 BPM | DIASTOLIC BLOOD PRESSURE: 72 MMHG | BODY MASS INDEX: 29.83 KG/M2

## 2024-05-21 DIAGNOSIS — D84.9 IMMUNOSUPPRESSION (MULTI): ICD-10-CM

## 2024-05-21 DIAGNOSIS — H53.50 COLOR BLINDNESS: ICD-10-CM

## 2024-05-21 DIAGNOSIS — Z15.89 MONOALLELIC MUTATION OF RAD51D GENE: ICD-10-CM

## 2024-05-21 DIAGNOSIS — K50.80 CROHN'S DISEASE OF BOTH SMALL AND LARGE INTESTINE WITHOUT COMPLICATION (MULTI): ICD-10-CM

## 2024-05-21 DIAGNOSIS — Z00.00 WELL ADULT EXAM: Primary | ICD-10-CM

## 2024-05-21 PROBLEM — G80.9 CEREBRAL PALSY (MULTI): Status: RESOLVED | Noted: 2024-05-15 | Resolved: 2024-05-21

## 2024-05-21 PROBLEM — K59.00 CONSTIPATION: Status: RESOLVED | Noted: 2017-06-09 | Resolved: 2024-05-21

## 2024-05-21 PROBLEM — N28.9 RENAL LESION: Status: RESOLVED | Noted: 2023-09-26 | Resolved: 2024-05-21

## 2024-05-21 PROBLEM — R01.1 HEART MURMUR: Status: RESOLVED | Noted: 2019-05-10 | Resolved: 2024-05-21

## 2024-05-21 PROBLEM — Z86.19 HISTORY OF HELICOBACTER PYLORI INFECTION: Status: RESOLVED | Noted: 2024-05-15 | Resolved: 2024-05-21

## 2024-05-21 PROBLEM — K21.9 LARYNGOPHARYNGEAL REFLUX (LPR): Status: RESOLVED | Noted: 2023-09-26 | Resolved: 2024-05-21

## 2024-05-21 PROCEDURE — 3008F BODY MASS INDEX DOCD: CPT | Performed by: PEDIATRICS

## 2024-05-21 PROCEDURE — 92552 PURE TONE AUDIOMETRY AIR: CPT | Performed by: PEDIATRICS

## 2024-05-21 PROCEDURE — 96127 BRIEF EMOTIONAL/BEHAV ASSMT: CPT | Performed by: PEDIATRICS

## 2024-05-21 PROCEDURE — 99395 PREV VISIT EST AGE 18-39: CPT | Performed by: PEDIATRICS

## 2024-05-21 PROCEDURE — 1036F TOBACCO NON-USER: CPT | Performed by: PEDIATRICS

## 2024-06-06 ENCOUNTER — OFFICE VISIT (OUTPATIENT)
Dept: GASTROENTEROLOGY | Facility: HOSPITAL | Age: 19
End: 2024-06-06
Payer: COMMERCIAL

## 2024-06-06 VITALS
TEMPERATURE: 97.6 F | HEART RATE: 92 BPM | DIASTOLIC BLOOD PRESSURE: 79 MMHG | SYSTOLIC BLOOD PRESSURE: 129 MMHG | BODY MASS INDEX: 30.51 KG/M2 | WEIGHT: 203.6 LBS | OXYGEN SATURATION: 96 %

## 2024-06-06 DIAGNOSIS — K50.80 CROHN'S DISEASE OF BOTH SMALL AND LARGE INTESTINE WITHOUT COMPLICATION (MULTI): Primary | ICD-10-CM

## 2024-06-06 PROCEDURE — 1036F TOBACCO NON-USER: CPT | Performed by: INTERNAL MEDICINE

## 2024-06-06 PROCEDURE — 99204 OFFICE O/P NEW MOD 45 MIN: CPT | Performed by: INTERNAL MEDICINE

## 2024-06-06 PROCEDURE — 99214 OFFICE O/P EST MOD 30 MIN: CPT | Mod: GC | Performed by: INTERNAL MEDICINE

## 2024-06-06 SDOH — ECONOMIC STABILITY: FOOD INSECURITY: WITHIN THE PAST 12 MONTHS, THE FOOD YOU BOUGHT JUST DIDN'T LAST AND YOU DIDN'T HAVE MONEY TO GET MORE.: NEVER TRUE

## 2024-06-06 SDOH — ECONOMIC STABILITY: FOOD INSECURITY: WITHIN THE PAST 12 MONTHS, YOU WORRIED THAT YOUR FOOD WOULD RUN OUT BEFORE YOU GOT MONEY TO BUY MORE.: NEVER TRUE

## 2024-06-06 ASSESSMENT — PAIN SCALES - GENERAL: PAINLEVEL: 0-NO PAIN

## 2024-06-06 ASSESSMENT — ENCOUNTER SYMPTOMS
TROUBLE SWALLOWING: 0
CHILLS: 0
COLOR CHANGE: 0
UNEXPECTED WEIGHT CHANGE: 0
FEVER: 0
ROS GI COMMENTS: AS ABOVE
SHORTNESS OF BREATH: 0

## 2024-06-06 ASSESSMENT — LIFESTYLE VARIABLES
SKIP TO QUESTIONS 9-10: 1
HOW MANY STANDARD DRINKS CONTAINING ALCOHOL DO YOU HAVE ON A TYPICAL DAY: 1 OR 2
HOW OFTEN DO YOU HAVE A DRINK CONTAINING ALCOHOL: MONTHLY OR LESS
HOW OFTEN DO YOU HAVE SIX OR MORE DRINKS ON ONE OCCASION: NEVER
AUDIT-C TOTAL SCORE: 1

## 2024-06-06 ASSESSMENT — PATIENT HEALTH QUESTIONNAIRE - PHQ9
SUM OF ALL RESPONSES TO PHQ9 QUESTIONS 1 & 2: 0
2. FEELING DOWN, DEPRESSED OR HOPELESS: NOT AT ALL
1. LITTLE INTEREST OR PLEASURE IN DOING THINGS: NOT AT ALL

## 2024-06-06 NOTE — PATIENT INSTRUCTIONS
Schedule for colonoscopy.  Continue infliximab.  We also discussed about infliximab subcutaneous injection if patient is interested in switching.  Follow-up in 6 months.

## 2024-06-06 NOTE — PROGRESS NOTES
Subjective     History of Present Illness:   Rob Francis is a 18 y.o. 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     Patient was diagnosed with Crohn’s disease in . He had significant weight loss at the time.  EGD and colonoscopy at [facility] in 2016 reportedly showed gastric ulcers, duodenitis, ileitis, and pancolitis.  Biopsies showed H. pylori gastritis, ileitis, and granulomatous colitis suggestive of CD.  Capsule endoscopy also showed duodenal and ileal ulcers.  Patient was initiated on Entocort, Apriso, and low dose Imuran 25 alternating with 50 mg daily (intermediate metabolizer).  He did not achieve remission with these medications and in , Remicade was initiated.  The oral medications were subsequently discontinued.  He achieved clinical and endoscopic remission on Remicade.  MRE in 2018 showed no evidence of acute inflammation. EGD and colonoscopy on 19 was unremarkable.     Currently, patient continues to feel well.  He reports having 2-3 Bms daily with formed stools.  He denies having nausea, vomiting, abdominal pain, or blood in stools.  Weight has been stable.  He has no dietary restrictions.  He receives infliximab at  Infusion and has no reaction (not on premedications).  He attends [facility].  He denies having any extraintestinal manifestations.      De-Philip Index  General well bein (0: very well)  Abdominal pain: 0 (0: none)  # of liquid stools: 0  Abdominal mass: 0 (0: none)  Complications: 0 (none)    Total Score: 0 (<5: remission)    Review of Systems  Review of Systems   Constitutional:  Negative for chills, fever and unexpected weight change.   HENT:  Negative for trouble swallowing.    Respiratory:  Negative for shortness of breath.    Cardiovascular:  Negative for chest pain.   Gastrointestinal:         As above   Skin:  Negative for color change.   All ROS otherwise negative.    Past Medical History   has a past  medical history of Nondisplaced fracture of proximal phalanx of left ring finger, initial encounter for closed fracture (05/15/2024).     Social History  Etoh- drinks on the weekends (beer, 10-15 beers per weekend); no smoking; no drug use.    Family History  No family history of IBD or colon cancer.    Allergies  Allergies   Allergen Reactions    Iodinated Contrast Media Anaphylaxis   + seasonal allergies.     Medications  Current Outpatient Medications   Medication Instructions    cholecalciferol (VITAMIN D3) 50 mcg, oral, Daily    inFLIXimab (REMICADE) 800 mg, intravenous, Every 8 weeks, For Maintenance: Every 8 weeks INSURANCE REQUIRES UNBRANDED REMICADE    inFLIXimab (REMICADE) 800 mg, intravenous, Every 8 weeks, For Maintenance: Every 8 weeks THIS SHOULD BE FOR UNBRANDED REMICADE    inFLIXimab-abda (RENFLEXIS) 800 mg, intravenous, Every 8 weeks, Dosing wt= 92.8kg    loratadine (Claritin) 10 mg tablet 1 tablet, oral, Daily   + MVM  + Vit D (in winter)    Objective   Visit Vitals  /79   Pulse 92   Temp 36.4 °C (97.6 °F)      Body mass index is 30.51 kg/m².  Physical Exam  Constitutional:       General: He is awake.      Appearance: Normal appearance.   HENT:      Head: Normocephalic and atraumatic.      Nose: Nose normal.      Mouth/Throat:      Mouth: Mucous membranes are moist.   Eyes:      Pupils: Pupils are equal, round, and reactive to light.   Pulmonary:      Effort: Pulmonary effort is normal.   Abdominal:      General: Abdomen is flat. Bowel sounds are normal. There is no distension.      Palpations: Abdomen is soft. There is no mass.      Tenderness: There is no abdominal tenderness. There is no guarding.   Neurological:      Mental Status: He is alert and oriented to person, place, and time. Mental status is at baseline.   Psychiatric:         Attention and Perception: Attention and perception normal.         Mood and Affect: Mood normal.         Behavior: Behavior normal.     Labs  Lab Results    Component Value Date    WBC 6.3 05/16/2024    HGB 14.2 05/16/2024    HCT 43.3 05/16/2024    MCV 90 05/16/2024     05/16/2024     Lab Results   Component Value Date    GLUCOSE 84 05/16/2024    CALCIUM 9.4 05/16/2024     05/16/2024    K 4.3 05/16/2024    CO2 25 05/16/2024     05/16/2024    BUN 22 05/16/2024    CREATININE 1.16 05/16/2024     Lab Results   Component Value Date    ALT 36 05/16/2024    AST 44 (H) 05/16/2024    GGT 15 05/02/2023    ALKPHOS 60 05/16/2024    BILITOT 0.7 05/16/2024     Lab Results   Component Value Date    INR 1.2 (H) 05/10/2022     Lab Results   Component Value Date    CRP 0.26 05/16/2024     Lab Results   Component Value Date    CALPS 17 03/08/2024    CALPS 43 06/16/2022    CALPS 47 06/24/2021       Assessment/Plan   Rob Francis is a 18 y.o. male who ileocolonic CD presenting to GI clinic as transition from pediatric GI.  Patient has been in clinical and endoscopic remission on infliximab infusion.  Patient is due for surveillance colonoscopy, will schedule.  Since he has been in deep remission for several years, we discussed the potential option to switch to subcutaneous infliximab injection for convenience if patient is interested.  Follow-up in 6 months.    Crohn's disease  Age at diagnosis: 10 yo  Disease involvement: ileocolonic disease  Disease behavior: inflammatory   Current symptoms: clinical remission  Prior meds: Imuran, Apriso and budesonide  Current meds: infliximab IV every 8 weeks  Last endoscopy: EGD and colonoscopy on 11/14/19 showed endoscopic and histologic remission  Last imaging: MRE on 8/30/19 showed no evidence of luminal inflammation  EIMs: none     Plan:  - continue infliximab IV at current dosing; discussed transitioning to subcutaneous infliximab if patient is interested  - ordered for surveillance colonoscopy  - encouraged to get pneumococcal vaccine and shingle (Shingrix) vaccination    Follow up with me in 6 months.      Chasity Barriga MD,  Fellow    Jose Do MD, Attending

## 2024-06-11 DIAGNOSIS — K50.80 CROHN'S DISEASE OF BOTH SMALL AND LARGE INTESTINE WITHOUT COMPLICATION (MULTI): Primary | ICD-10-CM

## 2024-06-13 DIAGNOSIS — K50.80 CROHN'S DISEASE OF BOTH SMALL AND LARGE INTESTINE WITHOUT COMPLICATION (MULTI): ICD-10-CM

## 2024-06-13 RX ORDER — SODIUM, POTASSIUM,MAG SULFATES 17.5-3.13G
SOLUTION, RECONSTITUTED, ORAL ORAL
Qty: 360 ML | Refills: 0 | Status: SHIPPED | OUTPATIENT
Start: 2024-06-13

## 2024-06-18 RX ORDER — INFLIXIMAB-DYYB 120 MG/ML
120 INJECTION SUBCUTANEOUS
Qty: 2 KIT | Refills: 6 | Status: SHIPPED | OUTPATIENT
Start: 2024-06-18

## 2024-06-19 ENCOUNTER — SPECIALTY PHARMACY (OUTPATIENT)
Dept: PHARMACY | Facility: CLINIC | Age: 19
End: 2024-06-19

## 2024-06-27 ENCOUNTER — HOSPITAL ENCOUNTER (OUTPATIENT)
Dept: GASTROENTEROLOGY | Facility: HOSPITAL | Age: 19
Setting detail: OUTPATIENT SURGERY
Discharge: HOME | End: 2024-06-27
Payer: COMMERCIAL

## 2024-06-27 ENCOUNTER — ANESTHESIA EVENT (OUTPATIENT)
Dept: GASTROENTEROLOGY | Facility: HOSPITAL | Age: 19
End: 2024-06-27
Payer: COMMERCIAL

## 2024-06-27 ENCOUNTER — ANESTHESIA (OUTPATIENT)
Dept: GASTROENTEROLOGY | Facility: HOSPITAL | Age: 19
End: 2024-06-27
Payer: COMMERCIAL

## 2024-06-27 VITALS
WEIGHT: 192.9 LBS | DIASTOLIC BLOOD PRESSURE: 48 MMHG | HEIGHT: 69 IN | BODY MASS INDEX: 28.57 KG/M2 | OXYGEN SATURATION: 96 % | SYSTOLIC BLOOD PRESSURE: 104 MMHG | RESPIRATION RATE: 16 BRPM | HEART RATE: 62 BPM | TEMPERATURE: 96.8 F

## 2024-06-27 DIAGNOSIS — K50.80 CROHN'S DISEASE OF BOTH SMALL AND LARGE INTESTINE WITHOUT COMPLICATION (MULTI): ICD-10-CM

## 2024-06-27 PROCEDURE — 45380 COLONOSCOPY AND BIOPSY: CPT | Performed by: COLON & RECTAL SURGERY

## 2024-06-27 PROCEDURE — 3700000002 HC GENERAL ANESTHESIA TIME - EACH INCREMENTAL 1 MINUTE

## 2024-06-27 PROCEDURE — 7100000010 HC PHASE TWO TIME - EACH INCREMENTAL 1 MINUTE

## 2024-06-27 PROCEDURE — 3700000001 HC GENERAL ANESTHESIA TIME - INITIAL BASE CHARGE

## 2024-06-27 PROCEDURE — 87186 SC STD MICRODIL/AGAR DIL: CPT | Mod: AHULAB | Performed by: COLON & RECTAL SURGERY

## 2024-06-27 PROCEDURE — 7100000009 HC PHASE TWO TIME - INITIAL BASE CHARGE

## 2024-06-27 PROCEDURE — 2500000004 HC RX 250 GENERAL PHARMACY W/ HCPCS (ALT 636 FOR OP/ED)

## 2024-06-27 RX ORDER — PROPOFOL 10 MG/ML
INJECTION, EMULSION INTRAVENOUS CONTINUOUS PRN
Status: DISCONTINUED | OUTPATIENT
Start: 2024-06-27 | End: 2024-06-27

## 2024-06-27 RX ORDER — SODIUM CHLORIDE, SODIUM LACTATE, POTASSIUM CHLORIDE, CALCIUM CHLORIDE 600; 310; 30; 20 MG/100ML; MG/100ML; MG/100ML; MG/100ML
INJECTION, SOLUTION INTRAVENOUS CONTINUOUS PRN
Status: DISCONTINUED | OUTPATIENT
Start: 2024-06-27 | End: 2024-06-27

## 2024-06-27 RX ORDER — INFLIXIMAB-DYYB 120 MG/ML
120 INJECTION SUBCUTANEOUS
Qty: 2 KIT | Refills: 6 | Status: SHIPPED | OUTPATIENT
Start: 2024-06-27 | End: 2024-07-26

## 2024-06-27 RX ORDER — DEXMEDETOMIDINE IN 0.9 % NACL 20 MCG/5ML
SYRINGE (ML) INTRAVENOUS AS NEEDED
Status: DISCONTINUED | OUTPATIENT
Start: 2024-06-27 | End: 2024-06-27

## 2024-06-27 ASSESSMENT — PAIN SCALES - GENERAL
PAINLEVEL_OUTOF10: 0 - NO PAIN
PAIN_LEVEL: 0
PAINLEVEL_OUTOF10: 0 - NO PAIN

## 2024-06-27 ASSESSMENT — COLUMBIA-SUICIDE SEVERITY RATING SCALE - C-SSRS
1. IN THE PAST MONTH, HAVE YOU WISHED YOU WERE DEAD OR WISHED YOU COULD GO TO SLEEP AND NOT WAKE UP?: NO
1. IN THE PAST MONTH, HAVE YOU WISHED YOU WERE DEAD OR WISHED YOU COULD GO TO SLEEP AND NOT WAKE UP?: NO
2. HAVE YOU ACTUALLY HAD ANY THOUGHTS OF KILLING YOURSELF?: NO
2. HAVE YOU ACTUALLY HAD ANY THOUGHTS OF KILLING YOURSELF?: NO
6. HAVE YOU EVER DONE ANYTHING, STARTED TO DO ANYTHING, OR PREPARED TO DO ANYTHING TO END YOUR LIFE?: NO
6. HAVE YOU EVER DONE ANYTHING, STARTED TO DO ANYTHING, OR PREPARED TO DO ANYTHING TO END YOUR LIFE?: NO

## 2024-06-27 ASSESSMENT — PAIN - FUNCTIONAL ASSESSMENT
PAIN_FUNCTIONAL_ASSESSMENT: 0-10

## 2024-06-27 NOTE — DISCHARGE INSTRUCTIONS
Patient Instructions after a Colonoscopy      The anesthetics, sedatives or narcotics which were given to you today will be acting in your body for the next 24 hours, so you might feel a little sleepy or groggy.  This feeling should slowly wear off. Carefully read and follow the instructions.     You received sedation today:  - Do not drive or operate any machinery or power tools of any kind.   - No alcoholic beverages today, not even beer or wine.  - Do not make any important decisions or sign any legal documents.  - No over the counter medications that contain alcohol or that may cause drowsiness.  - Do not make any important decisions or sign any legal documents.  - Make sure you have someone with you for first 24 hours.    While it is common to experience mild to moderate abdominal distention, gas, or belching after your procedure, if any of these symptoms occur following discharge from the GI Lab or within one week of having your procedure, call the [facility] to be advised whether a visit to your nearest Urgent Care or Emergency Department is indicated.  Take this paper with you if you go.     - If you develop an allergic reaction to the medications that were given during your procedure such as difficulty breathing, rash, hives, severe nausea, vomiting or lightheadedness.  - If you experience chest pain, shortness of breath, severe abdominal pain, fevers and chills.  -If you develop signs and symptoms of bleeding such as blood in your spit, if your stools turn black, tarry, or bloody  - If you have not urinated within 8 hours following your procedure.  - If your IV site becomes painful, red, inflamed, or looks infected.    If you received a biopsy/polypectomy/sphincterotomy the following instructions apply below:    __ Do not use Aspirin containing products, non-steroidal medications or anti-coagulants for one week following your procedure. (Examples of these types of medications are: Advil,  Arthrotec, Aleve, Coumadin, Ecotrin, Heparin, Ibuprofen, Indocin, Motrin, Naprosyn, Nuprin, Plavix, Vioxx, and Voltarin, or their generic forms.  This list is not all-inclusive.  Check with your physician or pharmacist before resuming medications.)   __ Eat a soft diet today.  Avoid foods that are poorly digested for the next 24 hours.  These foods would include: nuts, beans, lettuce, red meats, and fried foods. Start with liquids and advance your diet as tolerated, gradually work up to eating solids.   __ Do not have a Barium Study or Enema for one week.    Your physician recommends the additional following instructions:    -You have a contact number available for emergencies. The signs and symptoms of potential delayed complications were discussed with you. You may return to normal activities tomorrow.  -Resume your previous diet.  -Continue your present medications.   -We are waiting for your pathology results.  -Your physician has recommended a repeat colonoscopy (date to be determined after pending pathology results are reviewed) for surveillance based on pathology results.  -The findings and recommendations have been discussed with you.  -The findings and recommendations were discussed with your family.  - Please see Medication Reconciliation Form for new medication/medications prescribed.       If you experience any problems or have any questions following discharge from the GI Lab, please call:        Nurse Signature                                                                        Date___________________                                                                            Patient/Responsible Party Signature                                        Date___________________

## 2024-06-27 NOTE — H&P
"History Of Present Illness  Rob Francis is a 18 y.o. male presenting with Hx of Crohn's and groin abscess     Past Medical History  Past Medical History:   Diagnosis Date    Crohn's disease (Multi)     Nondisplaced fracture of proximal phalanx of left ring finger, initial encounter for closed fracture 05/15/2024    Closed nondisplaced fracture of proximal phalanx of left ring finger, initial encounter     Surgical History  Past Surgical History:   Procedure Laterality Date    COLONOSCOPY       Social History  He reports that he has never smoked. He has never been exposed to tobacco smoke. He has never used smokeless tobacco. He reports current alcohol use of about 2.0 standard drinks of alcohol per week. He reports that he does not use drugs.    Family History  No family history on file.     Allergies  Allergies   Allergen Reactions    Iodinated Contrast Media Anaphylaxis     Review of Systems     Physical Exam  Constitutional: Well developed, awake/alert/oriented x3, no distress, alert and cooperative   CV:  RRR  Lungs:  No audible wheezing, no tachypnea, chest symmetric, no increased WOB  Gastrointestinal: soft,  nontender  Neurological: alert and oriented     Last Recorded Vitals  Blood pressure 131/74, pulse 70, temperature 36.5 °C (97.7 °F), temperature source Temporal, resp. rate 16, height 1.753 m (5' 9\"), weight 87.5 kg (192 lb 14.4 oz), SpO2 99%.    Assessment/Plan   Colonoscopy and I+D of groin if needed     Sabrina Hansen MD  "

## 2024-06-27 NOTE — ANESTHESIA PREPROCEDURE EVALUATION
Patient: Rob Francis    Procedure Information       Date/Time: 06/27/24 0850    Scheduled providers: Sabrina Hansen MD; QUIQUE Morgan; Farshad Camejo MD; Sedrick Solano RN; Lesley Roberto MA    Procedure: COLONOSCOPY    Location: [facility]            Relevant Problems   Anesthesia (within normal limits)      Cardiac (within normal limits)      Pulmonary  Env allergies      Neuro (within normal limits)      GI   (+) Crohn's disease of both small and large intestine without complication (Multi)      /Renal (within normal limits)      Liver (within normal limits)      Endocrine (within normal limits)      Hematology (within normal limits)       Clinical information reviewed:   Tobacco   Meds   Med Hx  Surg Hx   Fam Hx  Soc Hx        NPO Detail:  NPO/Void Status  Carbohydrate Drink Given Prior to Surgery? : N  Date of Last Liquid: 06/27/24  Time of Last Liquid: 0330  Date of Last Solid: 06/25/24  Last Intake Type: GI prep  Time of Last Void: 0741         Physical Exam    Airway  Mallampati: II     Cardiovascular    Dental    Pulmonary    Abdominal            Anesthesia Plan    History of general anesthesia?: yes  History of complications of general anesthesia?: no    ASA 2     MAC     intravenous induction   Anesthetic plan and risks discussed with patient.

## 2024-06-27 NOTE — LETTER
Sabrina Hansen MD   [facility]   [address] [address]   [phone]      Dear Estrada,       It was my pleasure to see you at your recent colonoscopy.  At that time,  your examination was completely normal.  Biopsies were taken in the colon and the small bowel and they returned completely normal without any inflammation which means that the Crohn's medications are working perfectly.  The current recommendation is to repeat your colonoscopy based on your symptoms or in 3 years if you are doing well.     Thank you very much for allowing me to take part in your care, please feel free to contact me with any questions or concerns at [phone].          Sincerely,       Sabrina Hansen M.D. FACS, FASCRS    CC:  Primary Care:

## 2024-06-27 NOTE — ANESTHESIA POSTPROCEDURE EVALUATION
Patient: Rob Francis    Procedure Summary       Date: 06/27/24 Room / Location: [facility]    Anesthesia Start: 0921 Anesthesia Stop: 1005    Procedure: COLONOSCOPY Diagnosis: Crohn's disease of both small and large intestine without complication (Multi)    Scheduled Providers: Sabrina Hansen MD; QUIQUE Morgan; Farshad Camejo MD; Sedrick Solano RN; Lesley Roberto MA Responsible Provider: Farshad Camejo MD    Anesthesia Type: MAC ASA Status: 2            Anesthesia Type: MAC    Vitals Value Taken Time   /41 06/27/24 1005   Temp 36 °C (96.8 °F) 06/27/24 1002   Pulse 66 06/27/24 1015   Resp 18 06/27/24 1002   SpO2 97 % 06/27/24 1015   Vitals shown include unfiled device data.    Anesthesia Post Evaluation    Patient location during evaluation: bedside  Patient participation: complete - patient cannot participate  Level of consciousness: awake  Pain score: 0  Pain management: adequate  Airway patency: patent  Cardiovascular status: acceptable  Respiratory status: acceptable  Hydration status: acceptable  Postoperative Nausea and Vomiting: none        There were no known notable events for this encounter.

## 2024-06-28 ASSESSMENT — PAIN SCALES - GENERAL: PAINLEVEL_OUTOF10: 0 - NO PAIN

## 2024-06-30 LAB
BACTERIA SPEC CULT: ABNORMAL
GRAM STN SPEC: ABNORMAL
GRAM STN SPEC: ABNORMAL

## 2024-07-02 ENCOUNTER — SPECIALTY PHARMACY (OUTPATIENT)
Dept: PHARMACY | Facility: CLINIC | Age: 19
End: 2024-07-02

## 2024-07-02 PROCEDURE — RXMED WILLOW AMBULATORY MEDICATION CHARGE

## 2024-07-08 ENCOUNTER — PHARMACY VISIT (OUTPATIENT)
Dept: PHARMACY | Facility: CLINIC | Age: 19
End: 2024-07-08
Payer: COMMERCIAL

## 2024-07-08 LAB
LABORATORY COMMENT REPORT: NORMAL
PATH REPORT.FINAL DX SPEC: NORMAL
PATH REPORT.GROSS SPEC: NORMAL
PATH REPORT.TOTAL CANCER: NORMAL

## 2024-07-09 ENCOUNTER — SPECIALTY PHARMACY (OUTPATIENT)
Dept: PHARMACY | Facility: CLINIC | Age: 19
End: 2024-07-09

## 2024-07-09 NOTE — PROGRESS NOTES
[facility] Pharm[facility] Clinical Note    Rob Francis is a 18 y.o. male, who is on the specialty pharmacy service for management of:  Gastroenterology Core.    Rob Francis is taking: zymfentra.    Medication Receipt Date: 7/9/2024  Medication Start Date (planned or actual): 7/11/2024    Rob was contacted on 7/9/2024 at 1:58 PM for a virtual pharmacy visit with encounter number [id number] from:   [facility]  [address] [place] [id number]  Dept: [phone]  Dept Fax: [phone]    Rob was offered a Telemedicine Video visit or Telephone visit.  Rob consented to a telephone visit, which was performed.    The most recent encounter visit with the referring prescriber Dr. Do on 6/6/2024 (Date) was reviewed.  Pharmacy will continue to collaborate in the care of this patient with the referring prescriber Dr. Do.    General Assessment    Impression/Plan  IMPRESSION/PLAN:  Is patient high risk (potential patients:  pregnancy, geriatric, pediatric)?  no  Is laboratory follow-up needed? no  Is a clinical intervention needed? no  Next reassessment date? 3 months  Additional comments:   The patient was busy at work and felt confident in the switch from the infusion to the injectable infliximab, thus only an abridged initial assessment was performed.     Refer to the encounter summary report for documentation details about patient counseling and education.      Medication Adherence    The importance of adherence was discussed with the patient and they were advised to take the medication as prescribed by their provider. Patient was encouraged to call their physician's office if they have a question regarding a missed dose.      Patient was advised to contact the pharmacy if there are any changes to their medication list, including prescriptions, OTC medications, herbal products, or supplements. Patient was advised of [facility] Special[facility]  Pharmacy's dispensing process, refill timeline, contact information ([phone]), and patient management follow up. Patient confirmed understanding of education conducted during assessment. All patient questions and concerns were addressed to the best of my ability. Patient was encouraged to contact the specialty pharmacy with any questions or concerns.    Confirmed follow-up outreaches are properly scheduled and reviewed goals of therapy with the patient.        Vicki Dawkins, PharmD

## 2024-07-11 ENCOUNTER — APPOINTMENT (OUTPATIENT)
Dept: INFUSION THERAPY | Facility: CLINIC | Age: 19
End: 2024-07-11
Payer: COMMERCIAL

## 2024-07-26 ENCOUNTER — SPECIALTY PHARMACY (OUTPATIENT)
Dept: PHARMACY | Facility: CLINIC | Age: 19
End: 2024-07-26

## 2024-07-26 PROCEDURE — RXMED WILLOW AMBULATORY MEDICATION CHARGE

## 2024-08-01 ENCOUNTER — PHARMACY VISIT (OUTPATIENT)
Dept: PHARMACY | Facility: CLINIC | Age: 19
End: 2024-08-01
Payer: COMMERCIAL

## 2024-08-24 PROCEDURE — RXMED WILLOW AMBULATORY MEDICATION CHARGE

## 2024-08-26 ENCOUNTER — SPECIALTY PHARMACY (OUTPATIENT)
Dept: PHARMACY | Facility: CLINIC | Age: 19
End: 2024-08-26

## 2024-08-28 ENCOUNTER — APPOINTMENT (OUTPATIENT)
Dept: DERMATOLOGY | Facility: CLINIC | Age: 19
End: 2024-08-28
Payer: COMMERCIAL

## 2024-08-28 DIAGNOSIS — L64.9 ALOPECIA, MALE PATTERN: Primary | ICD-10-CM

## 2024-08-28 PROCEDURE — 1036F TOBACCO NON-USER: CPT | Performed by: NURSE PRACTITIONER

## 2024-08-28 PROCEDURE — 99203 OFFICE O/P NEW LOW 30 MIN: CPT | Performed by: NURSE PRACTITIONER

## 2024-08-28 RX ORDER — FINASTERIDE 1 MG/1
1 TABLET, FILM COATED ORAL DAILY
Qty: 30 TABLET | Refills: 6 | Status: SHIPPED | OUTPATIENT
Start: 2024-08-28 | End: 2025-03-26

## 2024-08-28 NOTE — PROGRESS NOTES
Subjective   Jordan Francis is a 18 y.o. male who presents for the following: Alopecia.  Alopecia  He complains of hair loss. The hair loss is localized in distribution, with onset approximately a few months ago. He describes symptoms of hair thinning. He denies scalp itch, scalp pain, scalp rash/ lesions, scalp redness, scalp scaling, and scalp tenderness. He does not have family history of hair loss. He does not have dietary restrictions. He does not wear a high tension hair style. He had serious medical illnesses or major weight loss during time of hair loss. PMHX Crohn's on Remicade.        Objective   Well appearing patient in no apparent distress; mood and affect are within normal limits.    A focused examination was performed including head, including the scalp, face, neck, nose, ears, eyelids, and lips. All findings within normal limits unless otherwise noted below.    Scalp  Decreased hair density in androgenetic areas. On dermoscopy, miniaturized hair follicles are seen and hair shafts of varying diameters are noted.      Assessment/Plan   Alopecia, male pattern  Scalp    -Discussed the mechanism of action of androgenetic alopecia, including the slowly progressive nature of the condition and the need for any therapy to be continued long term to maintain results.  -Discussed available therapies and anticipated efficacy, including topical and/or oral and/or injectable treatments.   -Recommend minoxidil 5% solution, apply BID for best results. This topical medication takes 6 months to gauge benefit and continual use to maintain that benefit. Potential side effects include irritation/rash/itching of the scalp and very rare chance of rapid heartbeat. Discontinue use if side effects occur and contact our office.  -Recommend finasteride by mouth. Discussed/information given on the potential adverse effects including, but not limited to, erectile dysfunction, decreased libido, and depression. There is a rare  possibility that these side effects may be irreversible and permanent.     Related Medications  finasteride (Propecia) 1 mg tablet  Take 1 tablet (1 mg) by mouth once daily. Do not crush, chew, or split.    Return in 6 months.

## 2024-08-30 ENCOUNTER — PHARMACY VISIT (OUTPATIENT)
Dept: PHARMACY | Facility: CLINIC | Age: 19
End: 2024-08-30
Payer: COMMERCIAL

## 2024-09-19 ENCOUNTER — SPECIALTY PHARMACY (OUTPATIENT)
Dept: PHARMACY | Facility: CLINIC | Age: 19
End: 2024-09-19

## 2024-09-19 PROCEDURE — RXMED WILLOW AMBULATORY MEDICATION CHARGE

## 2024-09-27 ENCOUNTER — PHARMACY VISIT (OUTPATIENT)
Dept: PHARMACY | Facility: CLINIC | Age: 19
End: 2024-09-27
Payer: COMMERCIAL

## 2024-10-09 ENCOUNTER — SPECIALTY PHARMACY (OUTPATIENT)
Dept: PHARMACY | Facility: CLINIC | Age: 19
End: 2024-10-09

## 2024-10-09 NOTE — PROGRESS NOTES
"[facility] Specialty Pharm[facility] Clinical Note    Rob Francis \"Jordan\" is a 18 y.o. male, who is on the specialty pharmacy service for management of:  Gastroenterology Core.    Rob Francis \"Jordan\" is taking: Zymfentra.    Medication Receipt Date: Last refill sent 10/1/24  Medication Start Date (planned or actual): Patient established on therapy    Rob was contacted on 10/9/2024 at 2:32 PM for a virtual pharmacy visit with encounter number [id number] from:   [facility]  [address] [place] [id number]  Dept: [phone]  Dept Fax: [phone]    Rob was offered a Telemedicine Video visit or Telephone visit.  Rob consented to a telephone visit, which was performed.    The most recent encounter visit with the referring prescriber Jose Do MD on 6/6/2024 (Date) was reviewed.  Pharmacy will continue to collaborate in the care of this patient with the referring prescriber Jose Do MD.    General Assessment      Impression/Plan  IMPRESSION/PLAN:  Is patient high risk (potential patients:  pregnancy, geriatric, pediatric)? No    Is laboratory follow-up needed? No  Is a clinical intervention needed? No  Next reassessment date? 1/7/2025  Additional comments:     Refer to the encounter summary report for documentation details about patient counseling and education.      Medication Adherence    The importance of adherence was discussed with the patient and they were advised to take the medication as prescribed by their provider. Patient was encouraged to call their physician's office if they have a question regarding a missed dose.     QOL/Patient Satisfaction  Rate your quality of life on scale of 1-10: 9  Rate your satisfaction with  Specialty Pharmacy on scale of 1-10: 10 - Completely satisfied      Patient was advised to contact the pharmacy if there are any changes to their medication list, including prescriptions, OTC medications, herbal products, or " supplements. Patient was advised of [facility] Pharm[facility]'s dispensing process, refill timeline, contact information ([phone]), and patient management follow up. Patient confirmed understanding of education conducted during assessment. All patient questions and concerns were addressed to the best of my ability. Patient was encouraged to contact the specialty pharmacy with any questions or concerns.    Confirmed follow-up outreaches are properly scheduled and reviewed goals of therapy with the patient.        Malu Veliz, PharmD

## 2024-10-15 ENCOUNTER — SPECIALTY PHARMACY (OUTPATIENT)
Dept: PHARMACY | Facility: CLINIC | Age: 19
End: 2024-10-15

## 2024-10-18 ENCOUNTER — SPECIALTY PHARMACY (OUTPATIENT)
Dept: PHARMACY | Facility: CLINIC | Age: 19
End: 2024-10-18

## 2024-10-18 PROCEDURE — RXMED WILLOW AMBULATORY MEDICATION CHARGE

## 2024-10-25 ENCOUNTER — PHARMACY VISIT (OUTPATIENT)
Dept: PHARMACY | Facility: CLINIC | Age: 19
End: 2024-10-25
Payer: COMMERCIAL

## 2024-11-13 ENCOUNTER — TELEPHONE (OUTPATIENT)
Dept: PEDIATRIC GASTROENTEROLOGY | Facility: HOSPITAL | Age: 19
End: 2024-11-13

## 2024-11-13 NOTE — TELEPHONE ENCOUNTER
Janis wants to know if patient will be reenrolled in the co-pay program for Renflexis. Last seen by Dr Arredondo. Left VM to call to make another appt.     FAX for [employer] is [phone].

## 2024-11-20 DIAGNOSIS — K50.80 CROHN'S DISEASE OF BOTH SMALL AND LARGE INTESTINE WITHOUT COMPLICATION (MULTI): ICD-10-CM

## 2024-11-22 ENCOUNTER — SPECIALTY PHARMACY (OUTPATIENT)
Dept: PHARMACY | Facility: CLINIC | Age: 19
End: 2024-11-22

## 2024-11-22 PROCEDURE — RXMED WILLOW AMBULATORY MEDICATION CHARGE

## 2024-11-22 RX ORDER — INFLIXIMAB-DYYB 120 MG/ML
120 INJECTION SUBCUTANEOUS
Qty: 2 KIT | Refills: 6 | Status: SHIPPED | OUTPATIENT
Start: 2024-11-22 | End: 2024-12-24

## 2024-11-26 ENCOUNTER — PHARMACY VISIT (OUTPATIENT)
Dept: PHARMACY | Facility: CLINIC | Age: 19
End: 2024-11-26
Payer: COMMERCIAL

## 2024-12-16 ENCOUNTER — OFFICE VISIT (OUTPATIENT)
Dept: GASTROENTEROLOGY | Facility: HOSPITAL | Age: 19
End: 2024-12-16
Payer: COMMERCIAL

## 2024-12-16 VITALS
SYSTOLIC BLOOD PRESSURE: 134 MMHG | OXYGEN SATURATION: 96 % | BODY MASS INDEX: 30.94 KG/M2 | DIASTOLIC BLOOD PRESSURE: 82 MMHG | HEART RATE: 86 BPM | TEMPERATURE: 98 F | WEIGHT: 209.5 LBS

## 2024-12-16 DIAGNOSIS — K50.80 CROHN'S DISEASE OF BOTH SMALL AND LARGE INTESTINE WITHOUT COMPLICATION (MULTI): Primary | ICD-10-CM

## 2024-12-16 PROCEDURE — 99213 OFFICE O/P EST LOW 20 MIN: CPT | Performed by: INTERNAL MEDICINE

## 2024-12-16 RX ORDER — INFLIXIMAB-DYYB 120 MG/ML
120 INJECTION SUBCUTANEOUS
Qty: 6 KIT | Refills: 3 | Status: SHIPPED | OUTPATIENT
Start: 2024-12-16 | End: 2025-01-16

## 2024-12-16 SDOH — ECONOMIC STABILITY: FOOD INSECURITY: WITHIN THE PAST 12 MONTHS, THE FOOD YOU BOUGHT JUST DIDN'T LAST AND YOU DIDN'T HAVE MONEY TO GET MORE.: NEVER TRUE

## 2024-12-16 SDOH — ECONOMIC STABILITY: FOOD INSECURITY: WITHIN THE PAST 12 MONTHS, YOU WORRIED THAT YOUR FOOD WOULD RUN OUT BEFORE YOU GOT MONEY TO BUY MORE.: NEVER TRUE

## 2024-12-16 ASSESSMENT — PATIENT HEALTH QUESTIONNAIRE - PHQ9
2. FEELING DOWN, DEPRESSED OR HOPELESS: NOT AT ALL
1. LITTLE INTEREST OR PLEASURE IN DOING THINGS: NOT AT ALL
SUM OF ALL RESPONSES TO PHQ9 QUESTIONS 1 & 2: 0

## 2024-12-16 ASSESSMENT — LIFESTYLE VARIABLES
SKIP TO QUESTIONS 9-10: 1
HOW MANY STANDARD DRINKS CONTAINING ALCOHOL DO YOU HAVE ON A TYPICAL DAY: 1 OR 2
HOW OFTEN DO YOU HAVE A DRINK CONTAINING ALCOHOL: 2-3 TIMES A WEEK
HOW OFTEN DO YOU HAVE SIX OR MORE DRINKS ON ONE OCCASION: NEVER
AUDIT-C TOTAL SCORE: 3

## 2024-12-16 ASSESSMENT — ENCOUNTER SYMPTOMS
SHORTNESS OF BREATH: 0
UNEXPECTED WEIGHT CHANGE: 0
CHILLS: 0
COLOR CHANGE: 0
TROUBLE SWALLOWING: 0
ROS GI COMMENTS: AS ABOVE
FEVER: 0

## 2024-12-16 ASSESSMENT — PAIN SCALES - GENERAL: PAINLEVEL_OUTOF10: 0-NO PAIN

## 2024-12-16 NOTE — PROGRESS NOTES
"Subjective     History of Present Illness:   Rob Francis \"Jordan\" is a 18 y.o. 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  Patient is doing well on infliximab subcutaneous injections.  He denies nausea, vomiting, abdominal pain, or diarrhea.  No joint pain, rash, or eye problems.  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    De-Philip Index  General well bein (0: very well)  Abdominal pain: 0 (0: none)  # of liquid stools: 0  Abdominal mass: 0 (0: none)  Complications: 0 (none)    Total Score: 0 (<5: remission)    IBD History:   Patient was diagnosed with Crohn’s disease in . He had significant weight loss at the time.  EGD and colonoscopy at [facility] in 2016 reportedly showed gastric ulcers, duodenitis, ileitis, and pancolitis.  Biopsies showed H. pylori gastritis, ileitis, and granulomatous colitis suggestive of CD.  Capsule endoscopy also showed duodenal and ileal ulcers.  Patient was initiated on Entocort, Apriso, and low dose Imuran 25 alternating with 50 mg daily (intermediate metabolizer).  He did not achieve remission with these medications and in , Remicade was initiated.  The oral medications were subsequently discontinued.  He achieved clinical and endoscopic remission on Remicade.  MRE in 2018 showed no evidence of acute inflammation. EGD and colonoscopy on 19 was unremarkable.     Patient transitioned to adult GI care in 2024. He underwent surveillance colonoscopy on 24 which showed endoscopic and histologic remission. Patient switched to infliximab SQ injections.      Review of Systems  Review of Systems   Constitutional:  Negative for chills, fever and unexpected weight change.   HENT:  Negative for trouble swallowing.    Respiratory:  Negative for shortness of breath.    Cardiovascular:  Negative for chest pain.   Gastrointestinal:         As above   Skin:  Negative for color change.   All ROS otherwise " negative.    Past Medical History   has a past medical history of Crohn's disease (Multi) and Nondisplaced fracture of proximal phalanx of left ring finger, initial encounter for closed fracture (05/15/2024).     Social History  Etoh- drinks on the weekends (beer, 10-15 beers per weekend); no smoking; no drug use.    Family History  No family history of IBD or colon cancer.    Allergies  Allergies   Allergen Reactions    Iodinated Contrast Media Anaphylaxis   + seasonal allergies.     Medications  Current Outpatient Medications   Medication Instructions    cholecalciferol (VITAMIN D3) 50 mcg, Daily    finasteride (PROPECIA) 1 mg, oral, Daily, Do not crush, chew, or split.    inFLIXimab-abda (RENFLEXIS) 800 mg, intravenous, Every 8 weeks, Dosing wt= 92.8kg    inFLIXimab-dyyb (Zymfentra) 120 mg/mL pen injector kit Inject 1 pen (120 mg) under the skin every 14 (fourteen) days.    loratadine (Claritin) 10 mg tablet 1 tablet, Daily       Objective   Visit Vitals  /82   Pulse 86   Temp 36.7 °C (98 °F)      Body mass index is 30.94 kg/m².  Physical Exam  Constitutional:       General: He is awake.      Appearance: Normal appearance.   HENT:      Head: Normocephalic and atraumatic.      Nose: Nose normal.      Mouth/Throat:      Mouth: Mucous membranes are moist.   Eyes:      Pupils: Pupils are equal, round, and reactive to light.   Pulmonary:      Effort: Pulmonary effort is normal.   Abdominal:      General: Abdomen is flat. Bowel sounds are normal. There is no distension.      Palpations: Abdomen is soft. There is no mass.      Tenderness: There is no abdominal tenderness. There is no guarding.   Neurological:      Mental Status: He is alert and oriented to person, place, and time. Mental status is at baseline.   Psychiatric:         Attention and Perception: Attention and perception normal.         Mood and Affect: Mood normal.         Behavior: Behavior normal.   Labs  Lab Results   Component Value Date    WBC  "6.3 05/16/2024    HGB 14.2 05/16/2024    HCT 43.3 05/16/2024    MCV 90 05/16/2024     05/16/2024     Lab Results   Component Value Date    GLUCOSE 84 05/16/2024    CALCIUM 9.4 05/16/2024     05/16/2024    K 4.3 05/16/2024    CO2 25 05/16/2024     05/16/2024    BUN 22 05/16/2024    CREATININE 1.16 05/16/2024     Lab Results   Component Value Date    ALT 36 05/16/2024    AST 44 (H) 05/16/2024    GGT 15 05/02/2023    ALKPHOS 60 05/16/2024    BILITOT 0.7 05/16/2024     Lab Results   Component Value Date    INR 1.2 (H) 05/10/2022     Lab Results   Component Value Date    CRP 0.26 05/16/2024     Lab Results   Component Value Date    CALPS 17 03/08/2024    CALPS 43 06/16/2022    CALPS 47 06/24/2021       Assessment/Plan   Rob Francis \"Jordan\" is a 18 y.o. 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  Patient is in clinical and endoscopic remission on infliximab injections.      Crohn's disease  Age at diagnosis: 10 yo  Disease involvement: ileocolonic disease  Disease behavior: inflammatory   Current symptoms: clinical remission  Prior meds: Imuran, Apriso and budesonide  Current meds: infliximab (Zymfentra) 120 mg subcutaneous injections every 2 weeks  Last endoscopy: colonoscopy on 6/27/24 showed endoscopic and histologic remission  Last imaging: MRE on 8/30/19 showed no evidence of luminal inflammation  EIMs: none     Vaccinations  Influenza: recommend influenza vaccine  Herpes Zoster: Shingrix vaccine recommended for all immunosuppressed adults    Plan:  - continue infliximab injections.  - recommend influenza and Shingrix vaccine  - follow-up in a year.    Jose Do MD  "

## 2024-12-16 NOTE — PATIENT INSTRUCTIONS
- continue infliximab injections.  - recommend influenza and Shingrix vaccine  - follow-up in a year.

## 2024-12-17 ENCOUNTER — SPECIALTY PHARMACY (OUTPATIENT)
Dept: PHARMACY | Facility: CLINIC | Age: 19
End: 2024-12-17

## 2024-12-17 PROCEDURE — RXMED WILLOW AMBULATORY MEDICATION CHARGE

## 2024-12-19 ENCOUNTER — PHARMACY VISIT (OUTPATIENT)
Dept: PHARMACY | Facility: CLINIC | Age: 19
End: 2024-12-19
Payer: COMMERCIAL

## 2025-01-13 ENCOUNTER — SPECIALTY PHARMACY (OUTPATIENT)
Dept: PHARMACY | Facility: CLINIC | Age: 20
End: 2025-01-13

## 2025-01-13 PROCEDURE — RXMED WILLOW AMBULATORY MEDICATION CHARGE

## 2025-01-15 ENCOUNTER — PHARMACY VISIT (OUTPATIENT)
Dept: PHARMACY | Facility: CLINIC | Age: 20
End: 2025-01-15
Payer: COMMERCIAL

## 2025-01-16 ENCOUNTER — SPECIALTY PHARMACY (OUTPATIENT)
Dept: PHARMACY | Facility: CLINIC | Age: 20
End: 2025-01-16

## 2025-01-16 NOTE — PROGRESS NOTES
"[facility] Pharm[facility] Clinical Note    Rob Francis \"Jordan\" is a 19 y.o. male, who is on the specialty pharmacy service for management of:  Gastroenterology Core.    Rob Francis \"Jordan\" is taking: Zymfentra.      Rob was contacted on 1/16/2025 at 12:54 PM for a virtual pharmacy visit with encounter number [id number] from:   [facility]  [facility] [address]  Dept: [phone]  Dept Fax: [phone]    Rob was offered a Telemedicine Video visit or Telephone visit.  Rob consented to a telephone visit, which was performed.    The most recent encounter visit with the referring prescriber Jose Do MD on 12/16/2024 was reviewed.  Pharmacy will continue to collaborate in the care of this patient with the referring prescriber Jose Do MD.    General Assessment      Impression/Plan  IMPRESSION/PLAN:  Is patient high risk (potential patients:  pregnancy, geriatric, pediatric)?  No  Is laboratory follow-up needed? No  Is a clinical intervention needed? No  Next reassessment date? ~7/16/2025  Additional comments:     Refer to the encounter summary report for documentation details about patient counseling and education.      Medication Adherence    The importance of adherence was discussed with the patient and they were advised to take the medication as prescribed by their provider. Patient was encouraged to call their physician's office if they have a question regarding a missed dose.     QOL/Patient Satisfaction  Rate your quality of life on scale of 1-10: 9  Rate your satisfaction with  Specialty Pharmacy on scale of 1-10: 7 (due to delayed calls for refills)      Patient was advised to contact the pharmacy if there are any changes to their medication list, including prescriptions, OTC medications, herbal products, or supplements. Patient was advised of [facility] Pharmacy's dispensing process, refill timeline, contact " information ([phone]), and patient management follow up. Patient confirmed understanding of education conducted during assessment. All patient questions and concerns were addressed to the best of my ability. Patient was encouraged to contact the specialty pharmacy with any questions or concerns.    Confirmed follow-up outreaches are properly scheduled and reviewed goals of therapy with the patient.        Adalgisa Rosado, PharmD   bed

## 2025-02-11 ENCOUNTER — OFFICE VISIT (OUTPATIENT)
Dept: URGENT CARE | Age: 20
End: 2025-02-11
Payer: COMMERCIAL

## 2025-02-11 VITALS
BODY MASS INDEX: 31.75 KG/M2 | RESPIRATION RATE: 16 BRPM | SYSTOLIC BLOOD PRESSURE: 135 MMHG | HEART RATE: 85 BPM | WEIGHT: 215 LBS | DIASTOLIC BLOOD PRESSURE: 83 MMHG | OXYGEN SATURATION: 98 % | TEMPERATURE: 98 F

## 2025-02-11 DIAGNOSIS — J02.9 SORE THROAT: ICD-10-CM

## 2025-02-11 DIAGNOSIS — J03.00 STREP TONSILLITIS: Primary | ICD-10-CM

## 2025-02-11 LAB
POC RAPID INFLUENZA A: NEGATIVE
POC RAPID INFLUENZA B: NEGATIVE
POC RAPID STREP: POSITIVE

## 2025-02-11 PROCEDURE — RXMED WILLOW AMBULATORY MEDICATION CHARGE

## 2025-02-11 RX ORDER — AMOXICILLIN 875 MG/1
875 TABLET, FILM COATED ORAL 2 TIMES DAILY
Qty: 20 TABLET | Refills: 0 | Status: SHIPPED | OUTPATIENT
Start: 2025-02-11 | End: 2025-02-21

## 2025-02-11 ASSESSMENT — PAIN SCALES - GENERAL: PAINLEVEL_OUTOF10: 7

## 2025-02-11 NOTE — PROGRESS NOTES
"Subjective   Patient ID: Rob Francis \"Neda" is a 19 y.o. male. They present today with a chief complaint of Sore Throat (3 days) and Fever.    History of Present Illness  HPI  Patient complains of sore throat and on and off fever for 3 days.  He has a slight cough.  No home COVID test done.        Past Medical History  Allergies as of 02/11/2025 - Reviewed 02/11/2025   Allergen Reaction Noted    Iodinated contrast media Anaphylaxis 05/30/2020       (Not in a hospital admission)       Past Medical History:   Diagnosis Date    Crohn's disease (Multi)     Nondisplaced fracture of proximal phalanx of left ring finger, initial encounter for closed fracture 05/15/2024    Closed nondisplaced fracture of proximal phalanx of left ring finger, initial encounter       Past Surgical History:   Procedure Laterality Date    COLONOSCOPY          reports that he has never smoked. He has never been exposed to tobacco smoke. He has never used smokeless tobacco. He reports current alcohol use of about 2.0 standard drinks of alcohol per week. He reports that he does not use drugs.    Review of Systems  Review of Systems                               Objective    Vitals:    02/11/25 1017   BP: 135/83   Pulse: 85   Resp: 16   Temp: 36.7 °C (98 °F)   SpO2: 98%   Weight: 97.5 kg (215 lb)     No LMP for male patient.    Physical Exam    Constitutional: Vital signs reviewed. Well developed and well nourished. Patient alert and without distress.     Head and Face: Normal, no orbital swelling.     Eyes: Pupils and irises normal. Pink conjunctivae, anicteric sclerae. No conjunctival injection.    Ears, Nose, Mouth and Throat: External inspection of ears: Normal. Otoscopic exam: Normal. Nasal Mucosa, septum and turbinates: Normal. Oropharynx: +2 Tonsils with exudates, no bulging pillars, no drooling or trismus.    Neck: No neck mass observed. Supple.    Cardiovascular: Heart rate normal, normal S1 and S2, no gallops, no murmurs and no " pericardial rub. Rhythm: Normal. No peripheral edema.    Pulmonary: No respiratory distress. Clear breath sounds.    Neurologic: Cortical function: Normal    Lymphatic: No cervical lymphadenopathy      Procedures    Point of Care Test & Imaging Results from this visit  Results for orders placed or performed in visit on 02/11/25   POCT Influenza A/B manually resulted   Result Value Ref Range    POC Rapid Influenza A Negative Negative    POC Rapid Influenza B Negative Negative   POCT rapid strep A manually resulted   Result Value Ref Range    POC Rapid Strep Positive (A) Negative      No results found.    Diagnostic study results (if any) were reviewed by Francine Dawson MD.    Assessment/Plan   Allergies, medications, history, and pertinent labs/EKGs/Imaging reviewed by Francine Dawson MD.     Medical Decision Making  Patient has access to [employer], he will check the instructions that we discussed verbally here.    Orders and Diagnoses  Diagnoses and all orders for this visit:  Strep tonsillitis  -     amoxicillin (Amoxil) 875 mg tablet; Take 1 tablet (875 mg) by mouth 2 times a day for 10 days.  Sore throat  -     POCT Influenza A/B manually resulted  -     POCT rapid strep A manually resulted      Medical Admin Record      Patient disposition: Home    Electronically signed by Francine Dawson MD  11:28 AM

## 2025-02-11 NOTE — PATIENT INSTRUCTIONS
Go to the emergency department for severe symptoms.      Follow-up with primary care as needed.    Oral hygiene twice a day as before.  Change her toothbrush after 24 hours of antibiotics.    Acetaminophen 500 mg (Extra StrengthTylenol), 2 tablets every 6 hours as needed for fever or pain.    Ibuprofen 200 mg (Advil or Motrin), 3 tablets with food every 6 hours as needed for fever or pain.

## 2025-02-13 ENCOUNTER — PHARMACY VISIT (OUTPATIENT)
Dept: PHARMACY | Facility: CLINIC | Age: 20
End: 2025-02-13
Payer: COMMERCIAL

## 2025-02-24 ENCOUNTER — APPOINTMENT (OUTPATIENT)
Dept: DERMATOLOGY | Facility: CLINIC | Age: 20
End: 2025-02-24
Payer: COMMERCIAL

## 2025-03-06 PROCEDURE — RXMED WILLOW AMBULATORY MEDICATION CHARGE

## 2025-03-11 ENCOUNTER — PHARMACY VISIT (OUTPATIENT)
Dept: PHARMACY | Facility: CLINIC | Age: 20
End: 2025-03-11
Payer: COMMERCIAL

## 2025-04-03 PROCEDURE — RXMED WILLOW AMBULATORY MEDICATION CHARGE

## 2025-04-07 ENCOUNTER — PHARMACY VISIT (OUTPATIENT)
Dept: PHARMACY | Facility: CLINIC | Age: 20
End: 2025-04-07
Payer: COMMERCIAL

## 2025-05-01 PROCEDURE — RXMED WILLOW AMBULATORY MEDICATION CHARGE

## 2025-05-06 ENCOUNTER — PHARMACY VISIT (OUTPATIENT)
Dept: PHARMACY | Facility: CLINIC | Age: 20
End: 2025-05-06
Payer: COMMERCIAL

## 2025-05-06 ENCOUNTER — OFFICE VISIT (OUTPATIENT)
Dept: URGENT CARE | Age: 20
End: 2025-05-06
Payer: COMMERCIAL

## 2025-05-06 VITALS
BODY MASS INDEX: 32.19 KG/M2 | DIASTOLIC BLOOD PRESSURE: 73 MMHG | WEIGHT: 218 LBS | SYSTOLIC BLOOD PRESSURE: 128 MMHG | TEMPERATURE: 98 F | OXYGEN SATURATION: 98 % | RESPIRATION RATE: 18 BRPM | HEART RATE: 105 BPM

## 2025-05-06 DIAGNOSIS — H10.9 BACTERIAL CONJUNCTIVITIS OF RIGHT EYE: Primary | ICD-10-CM

## 2025-05-06 PROCEDURE — 1036F TOBACCO NON-USER: CPT | Performed by: FAMILY MEDICINE

## 2025-05-06 PROCEDURE — 99213 OFFICE O/P EST LOW 20 MIN: CPT | Performed by: FAMILY MEDICINE

## 2025-05-06 RX ORDER — OFLOXACIN 3 MG/ML
2 SOLUTION/ DROPS OPHTHALMIC 4 TIMES DAILY
Qty: 5 ML | Refills: 0 | Status: SHIPPED | OUTPATIENT
Start: 2025-05-06 | End: 2025-05-13

## 2025-05-06 NOTE — PROGRESS NOTES
"Subjective   Patient ID: Rob Francis \"Jordan\" is a 19 y.o. male. They present today with a chief complaint of RT red eye (Woke up this morning ).    History of Present Illness  HPI    As noted above.  The right eye is red, itchy and crusty with discharge.  He denies fever, cough or cold symptoms.  No contact lens use.  He denies eye injury.  He does get seasonal allergies for which he takes over-the-counter Claritin as needed.  He has Crohn's disease on infliximab.  Patient states his Crohn's disease is in good control.      Past Medical History  Allergies as of 05/06/2025 - Reviewed 05/06/2025   Allergen Reaction Noted    Iodinated contrast media Anaphylaxis 05/30/2020       Prescriptions Prior to Admission[1]     Medical History[2]    Surgical History[3]     reports that he has never smoked. He has never been exposed to tobacco smoke. He has never used smokeless tobacco. He reports current alcohol use of about 2.0 standard drinks of alcohol per week. He reports that he does not use drugs.    Review of Systems  Review of Systems                               Objective    Vitals:    05/06/25 1210   BP: 128/73   BP Location: Left arm   Patient Position: Sitting   Pulse: 105   Resp: 18   Temp: 36.7 °C (98 °F)   TempSrc: Oral   SpO2: 98%   Weight: 98.9 kg (218 lb)     No LMP for male patient.    Physical Exam    Constitutional: Vital signs reviewed. Well developed and well nourished. Patient alert and without distress.     Head and Face: Normal, no orbital swelling.  No lesions.  Atraumatic.    Eyes: Pupils and irises normal. Pink conjunctivae, anicteric sclerae. + Peripheral bulbar and palpebral conjunctival injection on the right side with sparing of the limbus.  There is cobblestoning of the inner eyelids bilaterally.  Cornea clear bilaterally.  No eyelid lesions, no signs of cellulitis.  Normal EOMs.    Neurologic: Cortical function: Normal      Procedures    Point of Care Test & Imaging Results from this visit  No " results found for this visit on 05/06/25.   Imaging  No results found.    Cardiology, Vascular, and Other Imaging  No other imaging results found for the past 2 days      Diagnostic study results (if any) were reviewed by Francine Dawson MD.    Assessment/Plan   Allergies, medications, history, and pertinent labs/EKGs/Imaging reviewed by Francine Dawson MD.     Medical Decision Making  Bacterial conjunctivitis that could have been triggered by allergy exacerbation.  He will restart Claritin.  For the bacterial conjunctivitis, I prescribed ofloxacin eyedrops.  Frequent handwashing especially before and after instilling the eyedrops.  Follow-up as needed if worsening or not improving.  Patient expressed understanding and agreed.    Patient has access to [employer], he will review the record and instructions that we discussed during the visit today.    Orders and Diagnoses  Diagnoses and all orders for this visit:  Bacterial conjunctivitis of right eye  -     ofloxacin (Ocuflox) 0.3 % ophthalmic solution; Administer 2 drops into the right eye 4 times a day for 7 days.      Medical Admin Record      Patient disposition: Home    Electronically signed by Francine Dawson MD  1:45 PM           [1] (Not in a hospital admission)   [2]   Past Medical History:  Diagnosis Date    Crohn's disease (Multi)     Nondisplaced fracture of proximal phalanx of left ring finger, initial encounter for closed fracture 05/15/2024    Closed nondisplaced fracture of proximal phalanx of left ring finger, initial encounter   [3]   Past Surgical History:  Procedure Laterality Date    COLONOSCOPY

## 2025-05-06 NOTE — PATIENT INSTRUCTIONS
Bacterial conjunctivitis that could have been triggered by allergy exacerbation.  He will restart Claritin.  For the bacterial conjunctivitis, I prescribed ofloxacin eyedrops.  Frequent handwashing especially before and after instilling the eyedrops.  Follow-up as needed if worsening or not improving.  Patient expressed understanding and agreed.

## 2025-05-22 PROBLEM — L03.115 CELLULITIS OF RIGHT LOWER LIMB: Status: ACTIVE | Noted: 2024-06-16

## 2025-05-22 PROBLEM — R05.1 ACUTE COUGH: Status: ACTIVE | Noted: 2023-03-03

## 2025-05-27 PROBLEM — L03.115 CELLULITIS OF RIGHT LOWER LIMB: Status: RESOLVED | Noted: 2024-06-16 | Resolved: 2025-05-27

## 2025-05-27 PROBLEM — L65.9 ALOPECIA: Status: ACTIVE | Noted: 2025-05-27

## 2025-05-27 PROCEDURE — RXMED WILLOW AMBULATORY MEDICATION CHARGE

## 2025-05-27 NOTE — PROGRESS NOTES
"Subjective   History was provided by the pt.  Rob Francis \"Neda" is a 19 y.o. male who is here for this well visit.  - TDaP    Current Issues:  Current concerns include none.  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     Objective   /72 (BP Location: Right arm, Patient Position: Sitting)   Pulse 81   Ht 1.753 m (5' 9\")   Wt 96.6 kg (213 lb)   BMI 31.45 kg/m²   Physical Exam  Constitutional:       Appearance: Normal appearance.   HENT:      Right Ear: Tympanic membrane normal.      Left Ear: Tympanic membrane normal.      Nose: Nose normal.      Mouth/Throat:      Mouth: Mucous membranes are moist.      Pharynx: Oropharynx is clear.   Eyes:      Extraocular Movements: Extraocular movements intact.   Cardiovascular:      Rate and Rhythm: Normal rate and regular rhythm.      Pulses:           Radial pulses are 2+ on the right side and 2+ on the left side.      Heart sounds: No murmur heard.  Pulmonary:      Effort: Pulmonary effort is normal.      Breath sounds: Normal breath " sounds.   Abdominal:      General: Abdomen is flat.      Palpations: Abdomen is soft. There is no hepatomegaly, splenomegaly or mass.   Genitourinary:     Penis: Normal.       Testes: Normal.         Right: Testicular hydrocele not present.         Left: Testicular hydrocele not present.      Tay stage (genital): 5.   Musculoskeletal:         General: Normal range of motion.      Cervical back: Normal range of motion and neck supple.      Thoracic back: No scoliosis.      Lumbar back: No scoliosis.   Lymphadenopathy:      Cervical: No cervical adenopathy.   Skin:     General: Skin is warm and dry.   Neurological:      General: No focal deficit present.      Mental Status: He is alert.      Deep Tendon Reflexes:      Reflex Scores:       Patellar reflexes are 2+ on the right side and 2+ on the left side.  Psychiatric:         Mood and Affect: Mood normal.         Behavior: Behavior normal.         Assessment/Plan   19 y.o. male w/ NL G+D  1. Anticipatory guidance discussed.   2. Cleared for school/sports  3. Vaccine, if given, discussed and consented.  4. Follow up in 1 year for next well child exam or sooner with concerns.

## 2025-05-31 ENCOUNTER — APPOINTMENT (OUTPATIENT)
Dept: PEDIATRICS | Facility: CLINIC | Age: 20
End: 2025-05-31
Payer: COMMERCIAL

## 2025-05-31 VITALS
SYSTOLIC BLOOD PRESSURE: 122 MMHG | WEIGHT: 213 LBS | HEIGHT: 69 IN | HEART RATE: 81 BPM | DIASTOLIC BLOOD PRESSURE: 72 MMHG | BODY MASS INDEX: 31.55 KG/M2

## 2025-05-31 DIAGNOSIS — K50.80 CROHN'S DISEASE OF BOTH SMALL AND LARGE INTESTINE WITHOUT COMPLICATION (MULTI): ICD-10-CM

## 2025-05-31 DIAGNOSIS — E79.89: ICD-10-CM

## 2025-05-31 DIAGNOSIS — L65.9 ALOPECIA: ICD-10-CM

## 2025-05-31 DIAGNOSIS — Z23 NEED FOR VACCINATION: ICD-10-CM

## 2025-05-31 DIAGNOSIS — Z00.00 WELL ADULT EXAM: Primary | ICD-10-CM

## 2025-05-31 DIAGNOSIS — D84.9 IMMUNOSUPPRESSION: ICD-10-CM

## 2025-05-31 ASSESSMENT — PATIENT HEALTH QUESTIONNAIRE - PHQ9
5. POOR APPETITE OR OVEREATING: NOT AT ALL
2. FEELING DOWN, DEPRESSED OR HOPELESS: NOT AT ALL
1. LITTLE INTEREST OR PLEASURE IN DOING THINGS: NOT AT ALL
7. TROUBLE CONCENTRATING ON THINGS, SUCH AS READING THE NEWSPAPER OR WATCHING TELEVISION: NOT AT ALL
8. MOVING OR SPEAKING SO SLOWLY THAT OTHER PEOPLE COULD HAVE NOTICED. OR THE OPPOSITE - BEING SO FIDGETY OR RESTLESS THAT YOU HAVE BEEN MOVING AROUND A LOT MORE THAN USUAL: NOT AT ALL
5. POOR APPETITE OR OVEREATING: NOT AT ALL
4. FEELING TIRED OR HAVING LITTLE ENERGY: SEVERAL DAYS
2. FEELING DOWN, DEPRESSED OR HOPELESS: NOT AT ALL
10. IF YOU CHECKED OFF ANY PROBLEMS, HOW DIFFICULT HAVE THESE PROBLEMS MADE IT FOR YOU TO DO YOUR WORK, TAKE CARE OF THINGS AT HOME, OR GET ALONG WITH OTHER PEOPLE: NOT DIFFICULT AT ALL
4. FEELING TIRED OR HAVING LITTLE ENERGY: SEVERAL DAYS
3. TROUBLE FALLING OR STAYING ASLEEP: NOT AT ALL
9. THOUGHTS THAT YOU WOULD BE BETTER OFF DEAD, OR OF HURTING YOURSELF: NOT AT ALL
8. MOVING OR SPEAKING SO SLOWLY THAT OTHER PEOPLE COULD HAVE NOTICED. OR THE OPPOSITE, BEING SO FIGETY OR RESTLESS THAT YOU HAVE BEEN MOVING AROUND A LOT MORE THAN USUAL: NOT AT ALL
9. THOUGHTS THAT YOU WOULD BE BETTER OFF DEAD, OR OF HURTING YOURSELF: NOT AT ALL
3. TROUBLE FALLING OR STAYING ASLEEP OR SLEEPING TOO MUCH: NOT AT ALL
6. FEELING BAD ABOUT YOURSELF - OR THAT YOU ARE A FAILURE OR HAVE LET YOURSELF OR YOUR FAMILY DOWN: NOT AT ALL
6. FEELING BAD ABOUT YOURSELF - OR THAT YOU ARE A FAILURE OR HAVE LET YOURSELF OR YOUR FAMILY DOWN: NOT AT ALL
SUM OF ALL RESPONSES TO PHQ9 QUESTIONS 1 & 2: 0
10. IF YOU CHECKED OFF ANY PROBLEMS, HOW DIFFICULT HAVE THESE PROBLEMS MADE IT FOR YOU TO DO YOUR WORK, TAKE CARE OF THINGS AT HOME, OR GET ALONG WITH OTHER PEOPLE: NOT DIFFICULT AT ALL
SUM OF ALL RESPONSES TO PHQ QUESTIONS 1-9: 1
7. TROUBLE CONCENTRATING ON THINGS, SUCH AS READING THE NEWSPAPER OR WATCHING TELEVISION: NOT AT ALL
1. LITTLE INTEREST OR PLEASURE IN DOING THINGS: NOT AT ALL

## 2025-05-31 ASSESSMENT — ANXIETY QUESTIONNAIRES
6. BECOMING EASILY ANNOYED OR IRRITABLE: NOT AT ALL
5. BEING SO RESTLESS THAT IT IS HARD TO SIT STILL: NOT AT ALL
2. NOT BEING ABLE TO STOP OR CONTROL WORRYING: NOT AT ALL
1. FEELING NERVOUS, ANXIOUS, OR ON EDGE: NOT AT ALL
4. TROUBLE RELAXING: NOT AT ALL
3. WORRYING TOO MUCH ABOUT DIFFERENT THINGS: NOT AT ALL
4. TROUBLE RELAXING: NOT AT ALL
5. BEING SO RESTLESS THAT IT IS HARD TO SIT STILL: NOT AT ALL
3. WORRYING TOO MUCH ABOUT DIFFERENT THINGS: NOT AT ALL
7. FEELING AFRAID AS IF SOMETHING AWFUL MIGHT HAPPEN: NOT AT ALL
GAD7 TOTAL SCORE: 0
7. FEELING AFRAID AS IF SOMETHING AWFUL MIGHT HAPPEN: NOT AT ALL
6. BECOMING EASILY ANNOYED OR IRRITABLE: NOT AT ALL
2. NOT BEING ABLE TO STOP OR CONTROL WORRYING: NOT AT ALL
1. FEELING NERVOUS, ANXIOUS, OR ON EDGE: NOT AT ALL

## 2025-06-02 ENCOUNTER — PHARMACY VISIT (OUTPATIENT)
Dept: PHARMACY | Facility: CLINIC | Age: 20
End: 2025-06-02
Payer: COMMERCIAL

## 2025-06-20 DIAGNOSIS — L64.9 ALOPECIA, MALE PATTERN: ICD-10-CM

## 2025-06-22 RX ORDER — FINASTERIDE 1 MG/1
1 TABLET, FILM COATED ORAL DAILY
Qty: 90 TABLET | Refills: 0 | Status: SHIPPED | OUTPATIENT
Start: 2025-06-22 | End: 2025-09-20

## 2025-06-24 ENCOUNTER — SPECIALTY PHARMACY (OUTPATIENT)
Dept: PHARMACY | Facility: CLINIC | Age: 20
End: 2025-06-24

## 2025-07-02 ENCOUNTER — PHARMACY VISIT (OUTPATIENT)
Dept: PHARMACY | Facility: CLINIC | Age: 20
End: 2025-07-02
Payer: COMMERCIAL

## 2025-07-02 ENCOUNTER — APPOINTMENT (OUTPATIENT)
Dept: DERMATOLOGY | Facility: CLINIC | Age: 20
End: 2025-07-02
Payer: COMMERCIAL

## 2025-07-02 DIAGNOSIS — L64.9 ALOPECIA, MALE PATTERN: Primary | ICD-10-CM

## 2025-07-02 PROCEDURE — 1036F TOBACCO NON-USER: CPT | Performed by: NURSE PRACTITIONER

## 2025-07-02 PROCEDURE — 99213 OFFICE O/P EST LOW 20 MIN: CPT | Performed by: NURSE PRACTITIONER

## 2025-07-02 PROCEDURE — RXMED WILLOW AMBULATORY MEDICATION CHARGE

## 2025-07-02 RX ORDER — FINASTERIDE 1 MG/1
1 TABLET, FILM COATED ORAL DAILY
Qty: 90 TABLET | Refills: 3 | Status: SHIPPED | OUTPATIENT
Start: 2025-07-02 | End: 2026-07-02

## 2025-07-02 NOTE — PROGRESS NOTES
"Subjective     Rob Monk" is a 19 y.o. male who presents for the following: Alopecia (Established patient presents to office for follow up on male pattern alopecia.  Pt states that since starting the finasteride his hair is growing back thicker and not thinning as much as he remembers.  Pt endorses no side effects from medication. Pt is in need of refills at this time. ).          Review of Systems:  No other skin or systemic complaints other than what is documented elsewhere in the note.    The following portions of the chart were reviewed this encounter and updated as appropriate:  Tobacco  Allergies  Meds  Problems  Med Hx  Surg Hx  Fam Hx         Skin Cancer History  Biopsy Log Book  No skin cancers from Specimen Tracking.    Additional History      Specialty Problems          Dermatology Problems    Alopecia     Past Medical History:  Rob Monk"  has a past medical history of Nondisplaced fracture of proximal phalanx of left ring finger, initial encounter for closed fracture (05/15/2024).    Past Surgical History:  Rob Monk"  has a past surgical history that includes Colonoscopy.    Family History:  Patient family history is not on file.    Social History:  Rob Monk"  reports that he has never smoked. He has never been exposed to tobacco smoke. He has never used smokeless tobacco. He reports current alcohol use of about 2.0 standard drinks of alcohol per week. He reports that he does not use drugs.    Allergies:  Iodinated contrast media    Current Medications / CAM's:  Current Medications[1]     Objective   Well appearing patient in no apparent distress; mood and affect are within normal limits.    A focused skin examination was performed. All findings within normal limits unless otherwise noted below.    Assessment/Plan   Skin Exam  1. ALOPECIA, MALE PATTERN  Scalp  Decreased hair density in androgenetic areas. On dermoscopy, miniaturized hair follicles are " seen and hair shafts of varying diameters are noted. Improved since starting finasteride.   -Discussed the mechanism of action of androgenetic alopecia, including the slowly progressive nature of the condition and the need for any therapy to be continued long term to maintain results.  -Discussed available therapies and anticipated efficacy, including topical and/or oral and/or injectable treatments.   -Recommend minoxidil 5% solution, apply BID for best results. This topical medication takes 6 months to gauge benefit and continual use to maintain that benefit. Potential side effects include irritation/rash/itching of the scalp and very rare chance of rapid heartbeat. Discontinue use if side effects occur and contact our office.  -Recommend finasteride by mouth. Discussed/information given on the potential adverse effects including, but not limited to, erectile dysfunction, decreased libido, and depression. There is a rare possibility that these side effects may be irreversible and permanent.   Related Medications  finasteride (Propecia) 1 mg tablet  Take 1 tablet (1 mg) by mouth once daily. Do not crush, chew, or split.         [1]   Current Outpatient Medications:     cholecalciferol (Vitamin D3) 50 mcg (2,000 unit) capsule, Take 1 capsule (50 mcg) by mouth early in the morning.., Disp: , Rfl:     finasteride (Propecia) 1 mg tablet, Take 1 tablet (1 mg) by mouth once daily. Do not crush, chew, or split., Disp: 90 tablet, Rfl: 3    inFLIXimab-abda (Renflexis) injection, Infuse 800 mg into a venous catheter every 8 (eight) weeks for 3 doses.  Dosing wt= 92.8kg, Disp: 800 mg, Rfl: 2    inFLIXimab-dyyb (Zymfentra) 120 mg/mL, Inject 1 pen (120 mg) under the skin every 14 (fourteen) days., Disp: 6 kit, Rfl: 3    loratadine (Claritin) 10 mg tablet, Take 1 tablet (10 mg) by mouth once daily., Disp: , Rfl:

## 2025-07-02 NOTE — Clinical Note
-Discussed the mechanism of action of androgenetic alopecia, including the slowly progressive nature of the condition and the need for any therapy to be continued long term to maintain results.  -Discussed available therapies and anticipated efficacy, including topical and/or oral and/or injectable treatments.   -Recommend minoxidil 5% solution, apply BID for best results. This topical medication takes 6 months to gauge benefit and continual use to maintain that benefit. Potential side effects include irritation/rash/itching of the scalp and very rare chance of rapid heartbeat. Discontinue use if side effects occur and contact our office.  -Recommend finasteride by mouth. Discussed/information given on the potential adverse effects including, but not limited to, erectile dysfunction, decreased libido, and depression. There is a rare possibility that these side effects may be irreversible and permanent.

## 2025-07-02 NOTE — Clinical Note
Decreased hair density in androgenetic areas. On dermoscopy, miniaturized hair follicles are seen and hair shafts of varying diameters are noted. Improved since starting finasteride.

## 2025-07-05 ENCOUNTER — OFFICE VISIT (OUTPATIENT)
Dept: PEDIATRICS | Facility: CLINIC | Age: 20
End: 2025-07-05
Payer: COMMERCIAL

## 2025-07-05 VITALS — TEMPERATURE: 97 F | BODY MASS INDEX: 31.73 KG/M2 | HEIGHT: 69 IN | WEIGHT: 214.25 LBS

## 2025-07-05 DIAGNOSIS — M76.62 ACHILLES TENDINITIS OF LEFT LOWER EXTREMITY: Primary | ICD-10-CM

## 2025-07-05 PROCEDURE — 99213 OFFICE O/P EST LOW 20 MIN: CPT | Performed by: PEDIATRICS

## 2025-07-05 PROCEDURE — 3008F BODY MASS INDEX DOCD: CPT | Performed by: PEDIATRICS

## 2025-07-05 NOTE — PROGRESS NOTES
"Subjective   Patient ID: Rob Francis \"Jordan\" is a 19 y.o. male who presents for OTHER (Here by self. C/O Left heel pain. ).  - here for pain in L heel x 2mos  - no trauma recalled but worsening last few wks     - lots pain first 20m of working /running, then better completely, then 30m after gets bad again  - no o/n pain  - no meds tried but icing  -       Review of Systems  Temperature 36.1 °C (97 °F), temperature source Tympanic, height 1.746 m (5' 8.75\"), weight 97.2 kg (214 lb 4 oz).   Objective   Physical Exam  Constitutional:       Appearance: Normal appearance.   Musculoskeletal:      Left ankle: No swelling or ecchymosis. Tenderness: over distal Achil tendon.Normal range of motion.      Left Achilles Tendon: Tenderness present.   Neurological:      Mental Status: He is alert.       Assessment/Plan   19 y.o. male here w/ L Achil tendonitis   Disc ROM/stretching limits/ibuprofen x 5d/wrap/heel cups - call prn  "

## 2025-07-23 PROCEDURE — RXMED WILLOW AMBULATORY MEDICATION CHARGE

## 2025-07-24 ENCOUNTER — PHARMACY VISIT (OUTPATIENT)
Dept: PHARMACY | Facility: CLINIC | Age: 20
End: 2025-07-24
Payer: COMMERCIAL

## 2025-07-28 ENCOUNTER — SPECIALTY PHARMACY (OUTPATIENT)
Dept: PHARMACY | Facility: CLINIC | Age: 20
End: 2025-07-28

## 2025-08-15 ENCOUNTER — SPECIALTY PHARMACY (OUTPATIENT)
Dept: PHARMACY | Facility: CLINIC | Age: 20
End: 2025-08-15

## 2025-08-15 PROCEDURE — RXMED WILLOW AMBULATORY MEDICATION CHARGE

## 2025-08-20 ENCOUNTER — PHARMACY VISIT (OUTPATIENT)
Dept: PHARMACY | Facility: CLINIC | Age: 20
End: 2025-08-20
Payer: COMMERCIAL

## 2025-08-28 ENCOUNTER — OFFICE VISIT (OUTPATIENT)
Dept: SURGERY | Facility: CLINIC | Age: 20
End: 2025-08-28
Payer: COMMERCIAL

## 2025-08-28 VITALS
SYSTOLIC BLOOD PRESSURE: 125 MMHG | DIASTOLIC BLOOD PRESSURE: 83 MMHG | WEIGHT: 210 LBS | BODY MASS INDEX: 31.1 KG/M2 | HEART RATE: 80 BPM | OXYGEN SATURATION: 99 % | HEIGHT: 69 IN | TEMPERATURE: 98.4 F

## 2025-08-28 DIAGNOSIS — L02.419 AXILLARY ABSCESS: ICD-10-CM

## 2025-08-28 PROCEDURE — 99213 OFFICE O/P EST LOW 20 MIN: CPT | Mod: 25 | Performed by: COLON & RECTAL SURGERY

## 2025-08-28 PROCEDURE — 10060 I&D ABSCESS SIMPLE/SINGLE: CPT | Performed by: COLON & RECTAL SURGERY

## 2025-08-28 PROCEDURE — 87077 CULTURE AEROBIC IDENTIFY: CPT | Mod: AHULAB | Performed by: COLON & RECTAL SURGERY

## 2025-08-30 LAB
BACTERIA SPEC CULT: ABNORMAL
GRAM STN SPEC: ABNORMAL
GRAM STN SPEC: ABNORMAL

## 2026-07-06 ENCOUNTER — APPOINTMENT (OUTPATIENT)
Dept: DERMATOLOGY | Facility: CLINIC | Age: 21
End: 2026-07-06
Payer: COMMERCIAL